# Patient Record
Sex: MALE | Race: WHITE | NOT HISPANIC OR LATINO | Employment: FULL TIME | RURAL
[De-identification: names, ages, dates, MRNs, and addresses within clinical notes are randomized per-mention and may not be internally consistent; named-entity substitution may affect disease eponyms.]

---

## 2021-05-10 RX ORDER — CLOPIDOGREL BISULFATE 75 MG/1
75 TABLET ORAL DAILY
COMMUNITY
End: 2021-05-10 | Stop reason: SDUPTHER

## 2021-05-10 RX ORDER — CLOPIDOGREL BISULFATE 75 MG/1
75 TABLET ORAL DAILY
Qty: 90 TABLET | Refills: 1 | Status: SHIPPED | OUTPATIENT
Start: 2021-05-10 | End: 2021-07-06 | Stop reason: SDUPTHER

## 2021-05-10 RX ORDER — METOPROLOL SUCCINATE 100 MG/1
100 TABLET, EXTENDED RELEASE ORAL DAILY
Qty: 90 TABLET | Refills: 1 | Status: SHIPPED | OUTPATIENT
Start: 2021-05-10 | End: 2021-07-06 | Stop reason: SDUPTHER

## 2021-05-10 RX ORDER — METOPROLOL SUCCINATE 100 MG/1
100 TABLET, EXTENDED RELEASE ORAL DAILY
COMMUNITY
End: 2021-05-10 | Stop reason: SDUPTHER

## 2021-06-10 VITALS
HEIGHT: 68 IN | BODY MASS INDEX: 35.61 KG/M2 | WEIGHT: 235 LBS | DIASTOLIC BLOOD PRESSURE: 90 MMHG | SYSTOLIC BLOOD PRESSURE: 142 MMHG | HEART RATE: 76 BPM

## 2021-06-17 RX ORDER — LISINOPRIL 40 MG/1
40 TABLET ORAL DAILY
Qty: 30 TABLET | Refills: 1 | Status: SHIPPED | OUTPATIENT
Start: 2021-06-17 | End: 2021-07-06 | Stop reason: SDUPTHER

## 2021-07-06 ENCOUNTER — OFFICE VISIT (OUTPATIENT)
Dept: CARDIOLOGY | Facility: CLINIC | Age: 61
End: 2021-07-06
Payer: COMMERCIAL

## 2021-07-06 VITALS
BODY MASS INDEX: 36.07 KG/M2 | HEART RATE: 80 BPM | WEIGHT: 238 LBS | SYSTOLIC BLOOD PRESSURE: 142 MMHG | DIASTOLIC BLOOD PRESSURE: 100 MMHG | OXYGEN SATURATION: 98 % | HEIGHT: 68 IN

## 2021-07-06 DIAGNOSIS — Z79.899 OTHER LONG TERM (CURRENT) DRUG THERAPY: ICD-10-CM

## 2021-07-06 DIAGNOSIS — G47.30 SLEEP APNEA, UNSPECIFIED TYPE: ICD-10-CM

## 2021-07-06 DIAGNOSIS — E78.5 HYPERLIPIDEMIA, UNSPECIFIED HYPERLIPIDEMIA TYPE: Primary | ICD-10-CM

## 2021-07-06 DIAGNOSIS — I25.10 CORONARY ARTERY DISEASE INVOLVING NATIVE HEART WITHOUT ANGINA PECTORIS, UNSPECIFIED VESSEL OR LESION TYPE: ICD-10-CM

## 2021-07-06 DIAGNOSIS — I10 HYPERTENSION, UNSPECIFIED TYPE: ICD-10-CM

## 2021-07-06 DIAGNOSIS — E78.5 HYPERLIPIDEMIA, UNSPECIFIED HYPERLIPIDEMIA TYPE: ICD-10-CM

## 2021-07-06 DIAGNOSIS — I25.2 OLD MI (MYOCARDIAL INFARCTION): ICD-10-CM

## 2021-07-06 DIAGNOSIS — I25.10 CORONARY ARTERY DISEASE, ANGINA PRESENCE UNSPECIFIED, UNSPECIFIED VESSEL OR LESION TYPE, UNSPECIFIED WHETHER NATIVE OR TRANSPLANTED HEART: Primary | ICD-10-CM

## 2021-07-06 PROCEDURE — 3008F PR BODY MASS INDEX (BMI) DOCUMENTED: ICD-10-PCS | Mod: CPTII,,, | Performed by: NURSE PRACTITIONER

## 2021-07-06 PROCEDURE — 93010 ELECTROCARDIOGRAM REPORT: CPT | Mod: S$PBB,,, | Performed by: INTERNAL MEDICINE

## 2021-07-06 PROCEDURE — 93010 EKG 12-LEAD: ICD-10-PCS | Mod: S$PBB,,, | Performed by: INTERNAL MEDICINE

## 2021-07-06 PROCEDURE — 3008F BODY MASS INDEX DOCD: CPT | Mod: CPTII,,, | Performed by: NURSE PRACTITIONER

## 2021-07-06 PROCEDURE — 99214 OFFICE O/P EST MOD 30 MIN: CPT | Mod: S$PBB,,, | Performed by: NURSE PRACTITIONER

## 2021-07-06 PROCEDURE — 93005 ELECTROCARDIOGRAM TRACING: CPT | Mod: PBBFAC | Performed by: INTERNAL MEDICINE

## 2021-07-06 PROCEDURE — 99214 PR OFFICE/OUTPT VISIT, EST, LEVL IV, 30-39 MIN: ICD-10-PCS | Mod: S$PBB,,, | Performed by: NURSE PRACTITIONER

## 2021-07-06 PROCEDURE — 99213 OFFICE O/P EST LOW 20 MIN: CPT | Mod: PBBFAC | Performed by: NURSE PRACTITIONER

## 2021-07-06 RX ORDER — ROSUVASTATIN CALCIUM 5 MG/1
5 TABLET, COATED ORAL DAILY
Qty: 90 TABLET | Refills: 3 | Status: SHIPPED | OUTPATIENT
Start: 2021-07-06 | End: 2022-08-08 | Stop reason: SDUPTHER

## 2021-07-06 RX ORDER — LISINOPRIL 40 MG/1
40 TABLET ORAL DAILY
Qty: 30 TABLET | Refills: 1 | Status: SHIPPED | OUTPATIENT
Start: 2021-07-06 | End: 2021-12-20 | Stop reason: SDUPTHER

## 2021-07-06 RX ORDER — ASPIRIN 81 MG/1
81 TABLET ORAL DAILY
COMMUNITY

## 2021-07-06 RX ORDER — CLOPIDOGREL BISULFATE 75 MG/1
75 TABLET ORAL DAILY
Qty: 90 TABLET | Refills: 1 | Status: SHIPPED | OUTPATIENT
Start: 2021-07-06 | End: 2022-01-19 | Stop reason: SDUPTHER

## 2021-07-06 RX ORDER — METOPROLOL SUCCINATE 100 MG/1
100 TABLET, EXTENDED RELEASE ORAL DAILY
Qty: 90 TABLET | Refills: 1 | Status: SHIPPED | OUTPATIENT
Start: 2021-07-06 | End: 2022-01-19 | Stop reason: SDUPTHER

## 2021-12-20 DIAGNOSIS — I10 HYPERTENSION, UNSPECIFIED TYPE: ICD-10-CM

## 2021-12-20 RX ORDER — LISINOPRIL 40 MG/1
40 TABLET ORAL DAILY
Qty: 30 TABLET | Refills: 5 | Status: SHIPPED | OUTPATIENT
Start: 2021-12-20 | End: 2022-07-18 | Stop reason: SDUPTHER

## 2022-01-19 DIAGNOSIS — I10 HYPERTENSION, UNSPECIFIED TYPE: ICD-10-CM

## 2022-01-19 DIAGNOSIS — I25.10 CORONARY ARTERY DISEASE: ICD-10-CM

## 2022-01-19 RX ORDER — CLOPIDOGREL BISULFATE 75 MG/1
75 TABLET ORAL DAILY
Qty: 90 TABLET | Refills: 1 | Status: SHIPPED | OUTPATIENT
Start: 2022-01-19 | End: 2022-07-18

## 2022-01-19 RX ORDER — METOPROLOL SUCCINATE 100 MG/1
100 TABLET, EXTENDED RELEASE ORAL DAILY
Qty: 90 TABLET | Refills: 1 | Status: SHIPPED | OUTPATIENT
Start: 2022-01-19 | End: 2022-07-18 | Stop reason: SDUPTHER

## 2022-02-07 ENCOUNTER — OFFICE VISIT (OUTPATIENT)
Dept: INTERNAL MEDICINE | Facility: CLINIC | Age: 62
End: 2022-02-07
Payer: COMMERCIAL

## 2022-02-07 ENCOUNTER — HOSPITAL ENCOUNTER (OUTPATIENT)
Dept: RADIOLOGY | Facility: HOSPITAL | Age: 62
Discharge: HOME | End: 2022-02-07
Attending: INTERNAL MEDICINE
Payer: COMMERCIAL

## 2022-02-07 VITALS
TEMPERATURE: 99 F | RESPIRATION RATE: 16 BRPM | BODY MASS INDEX: 36.68 KG/M2 | DIASTOLIC BLOOD PRESSURE: 100 MMHG | OXYGEN SATURATION: 98 % | WEIGHT: 242 LBS | HEART RATE: 88 BPM | HEIGHT: 68 IN | SYSTOLIC BLOOD PRESSURE: 160 MMHG

## 2022-02-07 DIAGNOSIS — M19.011 OSTEOARTHRITIS OF RIGHT SHOULDER, UNSPECIFIED OSTEOARTHRITIS TYPE: ICD-10-CM

## 2022-02-07 DIAGNOSIS — R91.1 SOLITARY PULMONARY NODULE: Primary | ICD-10-CM

## 2022-02-07 DIAGNOSIS — G47.33 OSA (OBSTRUCTIVE SLEEP APNEA): ICD-10-CM

## 2022-02-07 DIAGNOSIS — E78.5 HYPERLIPIDEMIA LDL GOAL <100: ICD-10-CM

## 2022-02-07 DIAGNOSIS — I25.10 CORONARY ARTERY DISEASE, UNSPECIFIED VESSEL OR LESION TYPE, UNSPECIFIED WHETHER ANGINA PRESENT, UNSPECIFIED WHETHER NATIVE OR TRANSPLANTED HEART: ICD-10-CM

## 2022-02-07 DIAGNOSIS — E11.9 CONTROLLED TYPE 2 DIABETES MELLITUS WITHOUT COMPLICATION, WITHOUT LONG-TERM CURRENT USE OF INSULIN: ICD-10-CM

## 2022-02-07 DIAGNOSIS — Z12.5 PROSTATE CANCER SCREENING: ICD-10-CM

## 2022-02-07 DIAGNOSIS — I10 ESSENTIAL HYPERTENSION: ICD-10-CM

## 2022-02-07 PROCEDURE — 4010F PR ACE/ARB THEARPY RXD/TAKEN: ICD-10-PCS | Mod: ,,, | Performed by: INTERNAL MEDICINE

## 2022-02-07 PROCEDURE — 1160F PR REVIEW ALL MEDS BY PRESCRIBER/CLIN PHARMACIST DOCUMENTED: ICD-10-PCS | Mod: ,,, | Performed by: INTERNAL MEDICINE

## 2022-02-07 PROCEDURE — 99215 OFFICE O/P EST HI 40 MIN: CPT | Mod: PBBFAC | Performed by: INTERNAL MEDICINE

## 2022-02-07 PROCEDURE — 1160F RVW MEDS BY RX/DR IN RCRD: CPT | Mod: ,,, | Performed by: INTERNAL MEDICINE

## 2022-02-07 PROCEDURE — 73030 XR SHOULDER COMPLETE 2 OR MORE VIEWS RIGHT: ICD-10-PCS | Mod: 26,RT,, | Performed by: RADIOLOGY

## 2022-02-07 PROCEDURE — 73030 X-RAY EXAM OF SHOULDER: CPT | Mod: 26,RT,, | Performed by: RADIOLOGY

## 2022-02-07 PROCEDURE — 3080F PR MOST RECENT DIASTOLIC BLOOD PRESSURE >= 90 MM HG: ICD-10-PCS | Mod: ,,, | Performed by: INTERNAL MEDICINE

## 2022-02-07 PROCEDURE — 1159F PR MEDICATION LIST DOCUMENTED IN MEDICAL RECORD: ICD-10-PCS | Mod: ,,, | Performed by: INTERNAL MEDICINE

## 2022-02-07 PROCEDURE — 4010F ACE/ARB THERAPY RXD/TAKEN: CPT | Mod: ,,, | Performed by: INTERNAL MEDICINE

## 2022-02-07 PROCEDURE — 3077F PR MOST RECENT SYSTOLIC BLOOD PRESSURE >= 140 MM HG: ICD-10-PCS | Mod: ,,, | Performed by: INTERNAL MEDICINE

## 2022-02-07 PROCEDURE — 3008F BODY MASS INDEX DOCD: CPT | Mod: ,,, | Performed by: INTERNAL MEDICINE

## 2022-02-07 PROCEDURE — 3077F SYST BP >= 140 MM HG: CPT | Mod: ,,, | Performed by: INTERNAL MEDICINE

## 2022-02-07 PROCEDURE — 3080F DIAST BP >= 90 MM HG: CPT | Mod: ,,, | Performed by: INTERNAL MEDICINE

## 2022-02-07 PROCEDURE — 1159F MED LIST DOCD IN RCRD: CPT | Mod: ,,, | Performed by: INTERNAL MEDICINE

## 2022-02-07 PROCEDURE — 3008F PR BODY MASS INDEX (BMI) DOCUMENTED: ICD-10-PCS | Mod: ,,, | Performed by: INTERNAL MEDICINE

## 2022-02-07 PROCEDURE — 73030 X-RAY EXAM OF SHOULDER: CPT | Mod: TC,RT

## 2022-02-07 PROCEDURE — 99204 OFFICE O/P NEW MOD 45 MIN: CPT | Mod: S$PBB,,, | Performed by: INTERNAL MEDICINE

## 2022-02-07 PROCEDURE — 99204 PR OFFICE/OUTPT VISIT, NEW, LEVL IV, 45-59 MIN: ICD-10-PCS | Mod: S$PBB,,, | Performed by: INTERNAL MEDICINE

## 2022-02-07 RX ORDER — MELOXICAM 15 MG/1
15 TABLET ORAL DAILY
Qty: 90 TABLET | Refills: 3 | Status: SHIPPED | OUTPATIENT
Start: 2022-02-07 | End: 2022-11-03 | Stop reason: ALTCHOICE

## 2022-02-07 NOTE — PROGRESS NOTES
Subjective:       Patient ID: Eligio Felix is a 61 y.o. male.    Chief Complaint: Establish Care (Shoulder pain-spot on right lung-)    C/o cannot life r arm very high and pains at night  Also with spot on r lung that needs f/u    Review of Systems   Constitutional: Negative for appetite change, fatigue and unexpected weight change.   HENT: Negative for postnasal drip, trouble swallowing and goiter.    Eyes: Negative for visual disturbance.   Respiratory: Negative for apnea, choking and chest tightness.    Cardiovascular: Negative for chest pain and leg swelling.   Gastrointestinal: Negative for blood in stool, change in bowel habit and change in bowel habit.   Genitourinary: Negative for difficulty urinating and frequency.   Musculoskeletal: Negative for arthralgias, back pain and leg pain.   Integumentary:  Negative for rash.   Neurological: Negative for disturbances in coordination, memory loss and coordination difficulties.   Hematological: Negative for adenopathy.   Psychiatric/Behavioral: Negative for agitation. The patient is not hyperactive.          Objective:      Physical Exam  Vitals and nursing note reviewed.   Constitutional:       Appearance: Normal appearance. He is obese.   HENT:      Head: Normocephalic and atraumatic.      Right Ear: Tympanic membrane, ear canal and external ear normal.      Left Ear: Tympanic membrane, ear canal and external ear normal.      Nose: Nose normal.      Mouth/Throat:      Mouth: Mucous membranes are moist.      Pharynx: Oropharynx is clear.   Eyes:      Extraocular Movements: Extraocular movements intact.      Conjunctiva/sclera: Conjunctivae normal.      Pupils: Pupils are equal, round, and reactive to light.   Cardiovascular:      Rate and Rhythm: Normal rate and regular rhythm.      Pulses: Normal pulses.      Heart sounds: Normal heart sounds.   Pulmonary:      Effort: Pulmonary effort is normal.      Breath sounds: Normal breath sounds.   Abdominal:       General: Abdomen is flat. Bowel sounds are normal.      Palpations: Abdomen is soft.   Musculoskeletal:         General: Normal range of motion.      Cervical back: Normal range of motion.   Skin:     General: Skin is warm and dry.      Capillary Refill: Capillary refill takes less than 2 seconds.   Neurological:      General: No focal deficit present.      Mental Status: He is alert and oriented to person, place, and time.   Psychiatric:         Mood and Affect: Mood normal.         Behavior: Behavior normal.         Thought Content: Thought content normal.         Judgment: Judgment normal.         Assessment:       1. Essential hypertension    2. Hyperlipidemia LDL goal <100    3. Coronary artery disease, unspecified vessel or lesion type, unspecified whether angina present, unspecified whether native or transplanted heart    4. AYESHA (obstructive sleep apnea)    5. Controlled type 2 diabetes mellitus without complication, without long-term current use of insulin    6. Prostate cancer screening        Plan:         Patient Instructions   Continue surrent meds nd f/u 3 months        Problem List Items Addressed This Visit        Cardiac/Vascular    Coronary artery disease    Overview     DIAMOND mid LAD x 2 and very prox LAD x 1 2/10/2014           Other Visit Diagnoses     Essential hypertension    -  Primary    Relevant Orders    CBC Auto Differential    Comprehensive Metabolic Panel    TSH    Hyperlipidemia LDL goal <100        Relevant Orders    Lipid Panel    AYESHA (obstructive sleep apnea)        Controlled type 2 diabetes mellitus without complication, without long-term current use of insulin        Relevant Orders    Hemoglobin A1C    Prostate cancer screening        Relevant Orders    PSA, Screening

## 2022-02-11 ENCOUNTER — HOSPITAL ENCOUNTER (OUTPATIENT)
Dept: RADIOLOGY | Facility: HOSPITAL | Age: 62
Discharge: HOME | End: 2022-02-11
Attending: INTERNAL MEDICINE
Payer: COMMERCIAL

## 2022-02-11 DIAGNOSIS — R91.1 SOLITARY PULMONARY NODULE: ICD-10-CM

## 2022-02-11 PROCEDURE — 71250 CT THORAX DX C-: CPT | Mod: 26,,, | Performed by: RADIOLOGY

## 2022-02-11 PROCEDURE — 71250 CT THORAX DX C-: CPT | Mod: TC

## 2022-02-11 PROCEDURE — 71250 CT CHEST WITHOUT CONTRAST: ICD-10-PCS | Mod: 26,,, | Performed by: RADIOLOGY

## 2022-02-15 ENCOUNTER — OFFICE VISIT (OUTPATIENT)
Dept: CARDIOLOGY | Facility: CLINIC | Age: 62
End: 2022-02-15
Payer: COMMERCIAL

## 2022-02-15 VITALS
OXYGEN SATURATION: 98 % | HEART RATE: 77 BPM | HEIGHT: 68 IN | BODY MASS INDEX: 36.68 KG/M2 | DIASTOLIC BLOOD PRESSURE: 98 MMHG | WEIGHT: 242 LBS | SYSTOLIC BLOOD PRESSURE: 178 MMHG

## 2022-02-15 DIAGNOSIS — I10 HYPERTENSION, UNSPECIFIED TYPE: ICD-10-CM

## 2022-02-15 DIAGNOSIS — I25.10 CORONARY ARTERY DISEASE, UNSPECIFIED VESSEL OR LESION TYPE, UNSPECIFIED WHETHER ANGINA PRESENT, UNSPECIFIED WHETHER NATIVE OR TRANSPLANTED HEART: Primary | ICD-10-CM

## 2022-02-15 PROCEDURE — 1160F PR REVIEW ALL MEDS BY PRESCRIBER/CLIN PHARMACIST DOCUMENTED: ICD-10-PCS | Mod: ,,, | Performed by: NURSE PRACTITIONER

## 2022-02-15 PROCEDURE — 93010 EKG 12-LEAD: ICD-10-PCS | Mod: S$PBB,,, | Performed by: INTERNAL MEDICINE

## 2022-02-15 PROCEDURE — 3080F DIAST BP >= 90 MM HG: CPT | Mod: ,,, | Performed by: NURSE PRACTITIONER

## 2022-02-15 PROCEDURE — 93010 ELECTROCARDIOGRAM REPORT: CPT | Mod: S$PBB,,, | Performed by: INTERNAL MEDICINE

## 2022-02-15 PROCEDURE — 3008F BODY MASS INDEX DOCD: CPT | Mod: ,,, | Performed by: NURSE PRACTITIONER

## 2022-02-15 PROCEDURE — 1159F MED LIST DOCD IN RCRD: CPT | Mod: ,,, | Performed by: NURSE PRACTITIONER

## 2022-02-15 PROCEDURE — 3044F HG A1C LEVEL LT 7.0%: CPT | Mod: ,,, | Performed by: NURSE PRACTITIONER

## 2022-02-15 PROCEDURE — 3008F PR BODY MASS INDEX (BMI) DOCUMENTED: ICD-10-PCS | Mod: ,,, | Performed by: NURSE PRACTITIONER

## 2022-02-15 PROCEDURE — 3080F PR MOST RECENT DIASTOLIC BLOOD PRESSURE >= 90 MM HG: ICD-10-PCS | Mod: ,,, | Performed by: NURSE PRACTITIONER

## 2022-02-15 PROCEDURE — 1160F RVW MEDS BY RX/DR IN RCRD: CPT | Mod: ,,, | Performed by: NURSE PRACTITIONER

## 2022-02-15 PROCEDURE — 93005 ELECTROCARDIOGRAM TRACING: CPT | Mod: PBBFAC | Performed by: INTERNAL MEDICINE

## 2022-02-15 PROCEDURE — 99214 PR OFFICE/OUTPT VISIT, EST, LEVL IV, 30-39 MIN: ICD-10-PCS | Mod: S$PBB,,, | Performed by: NURSE PRACTITIONER

## 2022-02-15 PROCEDURE — 4010F ACE/ARB THERAPY RXD/TAKEN: CPT | Mod: ,,, | Performed by: NURSE PRACTITIONER

## 2022-02-15 PROCEDURE — 4010F PR ACE/ARB THEARPY RXD/TAKEN: ICD-10-PCS | Mod: ,,, | Performed by: NURSE PRACTITIONER

## 2022-02-15 PROCEDURE — 1159F PR MEDICATION LIST DOCUMENTED IN MEDICAL RECORD: ICD-10-PCS | Mod: ,,, | Performed by: NURSE PRACTITIONER

## 2022-02-15 PROCEDURE — 99213 OFFICE O/P EST LOW 20 MIN: CPT | Mod: PBBFAC | Performed by: NURSE PRACTITIONER

## 2022-02-15 PROCEDURE — 99214 OFFICE O/P EST MOD 30 MIN: CPT | Mod: S$PBB,,, | Performed by: NURSE PRACTITIONER

## 2022-02-15 PROCEDURE — 3077F SYST BP >= 140 MM HG: CPT | Mod: ,,, | Performed by: NURSE PRACTITIONER

## 2022-02-15 PROCEDURE — 3044F PR MOST RECENT HEMOGLOBIN A1C LEVEL <7.0%: ICD-10-PCS | Mod: ,,, | Performed by: NURSE PRACTITIONER

## 2022-02-15 PROCEDURE — 3077F PR MOST RECENT SYSTOLIC BLOOD PRESSURE >= 140 MM HG: ICD-10-PCS | Mod: ,,, | Performed by: NURSE PRACTITIONER

## 2022-02-15 RX ORDER — AMLODIPINE BESYLATE 5 MG/1
5 TABLET ORAL DAILY
Qty: 30 TABLET | Refills: 11 | Status: SHIPPED | OUTPATIENT
Start: 2022-02-15 | End: 2023-02-14 | Stop reason: SDUPTHER

## 2022-02-15 NOTE — PROGRESS NOTES
[facility] note        DATE OF SERVICE: 02/15/2022       PCP: INDER Jones      CHIEF COMPLAINT:   Chief Complaint   Patient presents with    Follow-up     Patient denies any cardiac complaints today.        HISTORY OF PRESENT ILLNESS:  Eligio Felix is a 61 y.o. male with a PMH of   Past Medical History:   Diagnosis Date    C. difficile colitis     Coronary artery disease     CTS (carpal tunnel syndrome)     Diabetes mellitus     Hyperlipidemia     Hypertension     Sleep apnea      who presents for routine follow up. He states after the last OV his bp got better but in the last few weeks has gotten bad again. He is wearing his cpap every night. He has arthritis in both shoulders with pain at night requiring meds. He was taking Advil but was changed to Mobic. He has also gained 4 lbs and been off of his usual diet.   Chief Complaint   Patient presents with    Follow-up     Patient denies any cardiac complaints today.            PAST MEDICAL HISTORY:  Past Medical History:   Diagnosis Date    C. difficile colitis     Coronary artery disease     CTS (carpal tunnel syndrome)     Diabetes mellitus     Hyperlipidemia     Hypertension     Sleep apnea        PAST SURGICAL HISTORY:  Past Surgical History:   Procedure Laterality Date    LITHOTRIPSY      LUMBAR SPINE SURGERY      NECK SURGERY         SOCIAL HISTORY:  Social History     Socioeconomic History    Marital status:    Tobacco Use    Smoking status: Never Smoker    Smokeless tobacco: Never Used   Substance and Sexual Activity    Alcohol use: Yes    Drug use: Never       FAMILY HISTORY:  Family History   Problem Relation Age of Onset    Pacemaker/defibrilator Father     Hypertension Sister          ALLERGIES:  Review of patient's allergies indicates:   Allergen Reactions    Iodinated contrast media         MEDICATIONS:    Current Outpatient Medications:     amLODIPine (NORVASC) 5 MG tablet, Take 1  "tablet (5 mg total) by mouth once daily., Disp: 30 tablet, Rfl: 11    aspirin (ECOTRIN) 81 MG EC tablet, Take 81 mg by mouth once daily., Disp: , Rfl:     clopidogreL (PLAVIX) 75 mg tablet, Take 1 tablet (75 mg total) by mouth once daily., Disp: 90 tablet, Rfl: 1    lisinopriL (PRINIVIL,ZESTRIL) 40 MG tablet, Take 1 tablet (40 mg total) by mouth once daily., Disp: 30 tablet, Rfl: 5    meloxicam (MOBIC) 15 MG tablet, Take 1 tablet (15 mg total) by mouth once daily., Disp: 90 tablet, Rfl: 3    metoprolol succinate (TOPROL-XL) 100 MG 24 hr tablet, Take 1 tablet (100 mg total) by mouth once daily., Disp: 90 tablet, Rfl: 1    rosuvastatin (CRESTOR) 5 MG tablet, Take 1 tablet (5 mg total) by mouth once daily., Disp: 90 tablet, Rfl: 3  Medications have been reviewed but not reconciled. He did not bring his meds today.    PHYSICAL EXAM:  BP (!) 178/98 (BP Location: Left arm, Patient Position: Sitting)   Pulse 77   Ht 5' 8" (1.727 m)   Wt 109.8 kg (242 lb)   SpO2 98%   BMI 36.80 kg/m²   Wt Readings from Last 3 Encounters:   02/15/22 109.8 kg (242 lb)   02/07/22 109.8 kg (242 lb)   07/06/21 108 kg (238 lb)      Body mass index is 36.8 kg/m².    Physical Exam  Vitals and nursing note reviewed.   Constitutional:       Appearance: Normal appearance. He is obese.   HENT:      Head: Normocephalic and atraumatic.   Eyes:      Pupils: Pupils are equal, round, and reactive to light.   Neck:      Vascular: No carotid bruit.   Cardiovascular:      Rate and Rhythm: Normal rate and regular rhythm.      Pulses: Normal pulses.      Heart sounds: Normal heart sounds.   Pulmonary:      Effort: Pulmonary effort is normal.      Breath sounds: Normal breath sounds.   Abdominal:      General: Bowel sounds are normal.      Palpations: Abdomen is soft.   Musculoskeletal:      Cervical back: Neck supple.      Right lower leg: No edema.      Left lower leg: No edema.   Skin:     General: Skin is warm and dry.      Capillary Refill: " Capillary refill takes less than 2 seconds.   Neurological:      General: No focal deficit present.      Mental Status: He is alert and oriented to person, place, and time.   Psychiatric:         Mood and Affect: Mood normal.         Behavior: Behavior normal.         LABS REVIEWED:  Lab Results   Component Value Date    WBC 9.11 02/07/2022    RBC 5.12 02/07/2022    HGB 15.4 02/07/2022    HCT 47.0 02/07/2022    MCV 91.8 02/07/2022    MCH 30.1 02/07/2022    MCHC 32.8 02/07/2022    RDW 13.9 02/07/2022     02/07/2022    MPV 10.1 02/07/2022    NRBC 0.0 02/07/2022     Lab Results   Component Value Date     02/07/2022    K 4.5 02/07/2022     (H) 02/07/2022    CO2 26 02/07/2022    BUN 25 (H) 02/07/2022     Lab Results   Component Value Date    AST 18 02/07/2022    ALT 33 02/07/2022     Lab Results   Component Value Date     (H) 02/07/2022    HGBA1C 6.0 02/07/2022     Lab Results   Component Value Date    CHOL 216 (H) 02/07/2022    HDL 43 02/07/2022    TRIG 116 02/07/2022    CHOLHDL 5.0 02/07/2022       CARDIAC STUDIES REVIEWED:EKG: RSR; RBBB; HR 75 bpm      ASSESSMENT:   Patient Active Problem List   Diagnosis    Coronary artery disease    Old MI (myocardial infarction)    Hypertension    Hyperlipidemia    Sleep apnea            Problem List Items Addressed This Visit        Cardiac/Vascular    Coronary artery disease - Primary    Overview     DIAMOND mid LAD x 2 and very prox LAD x 1 2/10/2014         Relevant Orders    EKG 12-lead    Hypertension    Relevant Medications    amLODIPine (NORVASC) 5 MG tablet           PLAN: add Norvasc 5 mg po daily  bp check in 2 weeks  Avoid NSAIDS as much as possible  Orders Placed This Encounter   Procedures    EKG 12-lead     Standing Status:   Future     Standing Expiration Date:   2/15/2023     Order Specific Question:   Diagnosis     Answer:   CAD (coronary artery disease) [[id number]]      RTC: 6 months

## 2022-08-08 DIAGNOSIS — I25.10 CORONARY ARTERY DISEASE, UNSPECIFIED VESSEL OR LESION TYPE, UNSPECIFIED WHETHER ANGINA PRESENT, UNSPECIFIED WHETHER NATIVE OR TRANSPLANTED HEART: ICD-10-CM

## 2022-08-08 DIAGNOSIS — E78.5 HYPERLIPIDEMIA, UNSPECIFIED HYPERLIPIDEMIA TYPE: ICD-10-CM

## 2022-08-08 RX ORDER — ROSUVASTATIN CALCIUM 5 MG/1
5 TABLET, COATED ORAL DAILY
Qty: 90 TABLET | Refills: 0 | Status: SHIPPED | OUTPATIENT
Start: 2022-08-08 | End: 2022-11-15

## 2022-11-02 ENCOUNTER — OFFICE VISIT (OUTPATIENT)
Dept: PRIMARY CARE CLINIC | Facility: CLINIC | Age: 62
End: 2022-11-02
Payer: COMMERCIAL

## 2022-11-02 VITALS
OXYGEN SATURATION: 99 % | HEIGHT: 68 IN | TEMPERATURE: 98 F | RESPIRATION RATE: 17 BRPM | WEIGHT: 230 LBS | DIASTOLIC BLOOD PRESSURE: 80 MMHG | SYSTOLIC BLOOD PRESSURE: 130 MMHG | HEART RATE: 87 BPM | BODY MASS INDEX: 34.86 KG/M2

## 2022-11-02 DIAGNOSIS — N13.30 HYDRONEPHROSIS OF LEFT KIDNEY: Primary | ICD-10-CM

## 2022-11-02 DIAGNOSIS — N20.0 URINARY TRACT OBSTRUCTION DUE TO KIDNEY STONE: ICD-10-CM

## 2022-11-02 DIAGNOSIS — N13.8 URINARY TRACT OBSTRUCTION DUE TO KIDNEY STONE: ICD-10-CM

## 2022-11-02 PROCEDURE — 3008F BODY MASS INDEX DOCD: CPT | Mod: ,,, | Performed by: NURSE PRACTITIONER

## 2022-11-02 PROCEDURE — 3075F SYST BP GE 130 - 139MM HG: CPT | Mod: ,,, | Performed by: NURSE PRACTITIONER

## 2022-11-02 PROCEDURE — 4010F ACE/ARB THERAPY RXD/TAKEN: CPT | Mod: ,,, | Performed by: NURSE PRACTITIONER

## 2022-11-02 PROCEDURE — 3079F DIAST BP 80-89 MM HG: CPT | Mod: ,,, | Performed by: NURSE PRACTITIONER

## 2022-11-02 PROCEDURE — 3044F HG A1C LEVEL LT 7.0%: CPT | Mod: ,,, | Performed by: NURSE PRACTITIONER

## 2022-11-02 PROCEDURE — 4010F PR ACE/ARB THEARPY RXD/TAKEN: ICD-10-PCS | Mod: ,,, | Performed by: NURSE PRACTITIONER

## 2022-11-02 PROCEDURE — 3079F PR MOST RECENT DIASTOLIC BLOOD PRESSURE 80-89 MM HG: ICD-10-PCS | Mod: ,,, | Performed by: NURSE PRACTITIONER

## 2022-11-02 PROCEDURE — 3008F PR BODY MASS INDEX (BMI) DOCUMENTED: ICD-10-PCS | Mod: ,,, | Performed by: NURSE PRACTITIONER

## 2022-11-02 PROCEDURE — 99213 PR OFFICE/OUTPT VISIT, EST, LEVL III, 20-29 MIN: ICD-10-PCS | Mod: ,,, | Performed by: NURSE PRACTITIONER

## 2022-11-02 PROCEDURE — 3075F PR MOST RECENT SYSTOLIC BLOOD PRESS GE 130-139MM HG: ICD-10-PCS | Mod: ,,, | Performed by: NURSE PRACTITIONER

## 2022-11-02 PROCEDURE — 3044F PR MOST RECENT HEMOGLOBIN A1C LEVEL <7.0%: ICD-10-PCS | Mod: ,,, | Performed by: NURSE PRACTITIONER

## 2022-11-02 PROCEDURE — 99213 OFFICE O/P EST LOW 20 MIN: CPT | Mod: ,,, | Performed by: NURSE PRACTITIONER

## 2022-11-02 RX ORDER — HYDROCODONE BITARTRATE AND ACETAMINOPHEN 5; 325 MG/1; MG/1
TABLET ORAL
COMMUNITY
Start: 2022-05-20 | End: 2022-11-29

## 2022-11-02 NOTE — PROGRESS NOTES
Subjective:       Patient ID: Eligio Felix is a 62 y.o. male.    Chief Complaint: Nephrolithiasis, Hematuria, Flank Pain (left), and Nausea    HPI Eligio Felix presents today with above complaints  Reports prior history of kidney stones. Patient was most recently evaluated in [place] while working. 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    Review of Systems   Constitutional:  Negative for fatigue, fever and unexpected weight change.   HENT:  Negative for nasal congestion, postnasal drip and sore throat.    Eyes:  Negative for pain and redness.   Respiratory:  Negative for cough, shortness of breath and wheezing.    Cardiovascular:  Negative for chest pain and leg swelling.   Gastrointestinal:  Negative for abdominal pain, constipation, diarrhea and nausea.   Genitourinary:  Positive for flank pain. Negative for difficulty urinating, dysuria and hematuria.   Musculoskeletal:  Negative for gait problem.   Integumentary:  Negative for color change and rash.   Neurological:  Negative for vertigo, syncope and headaches.       Objective:      Physical Exam  Constitutional:       General: He is not in acute distress.     Appearance: Normal appearance. He is not ill-appearing.   HENT:      Head: Normocephalic.   Eyes:      Conjunctiva/sclera: Conjunctivae normal.      Pupils: Pupils are equal, round, and reactive to light.   Cardiovascular:      Rate and Rhythm: Normal rate and regular rhythm.      Heart sounds: No murmur heard.  Pulmonary:      Effort: Pulmonary effort is normal. No respiratory distress.      Breath sounds: Normal breath sounds. No wheezing, rhonchi or rales.   Abdominal:      General: Bowel sounds are normal.      Palpations: Abdomen is soft.      Tenderness: There is no abdominal tenderness.   Musculoskeletal:         General: No swelling. Normal range of motion.      Cervical back: Neck supple. No rigidity or tenderness.      Right lower leg: No edema.      Left lower leg: No edema.    Skin:     General: Skin is warm and dry.   Neurological:      General: No focal deficit present.      Mental Status: He is alert and oriented to person, place, and time.      Cranial Nerves: No cranial nerve deficit.       Assessment:       Problem List Items Addressed This Visit    None  Visit Diagnoses       Hydronephrosis of left kidney    -  Primary    Relevant Orders    Ambulatory referral/consult to Urology    Urinary tract obstruction due to kidney stone        Relevant Orders    Ambulatory referral/consult to Urology              Plan:       Referral to urology for evaluation  Patient has pain medication on-hand if needed  To ED with any s/s of obstruction

## 2022-11-03 ENCOUNTER — TELEPHONE (OUTPATIENT)
Dept: UROLOGY | Facility: CLINIC | Age: 62
End: 2022-11-03
Payer: COMMERCIAL

## 2022-11-03 ENCOUNTER — HOSPITAL ENCOUNTER (OUTPATIENT)
Dept: RADIOLOGY | Facility: HOSPITAL | Age: 62
Discharge: HOME | End: 2022-11-03
Attending: NURSE PRACTITIONER
Payer: COMMERCIAL

## 2022-11-03 ENCOUNTER — OFFICE VISIT (OUTPATIENT)
Dept: UROLOGY | Facility: CLINIC | Age: 62
End: 2022-11-03
Payer: COMMERCIAL

## 2022-11-03 VITALS
HEIGHT: 68 IN | DIASTOLIC BLOOD PRESSURE: 78 MMHG | HEART RATE: 93 BPM | WEIGHT: 230 LBS | OXYGEN SATURATION: 86 % | BODY MASS INDEX: 34.86 KG/M2 | TEMPERATURE: 98 F | SYSTOLIC BLOOD PRESSURE: 128 MMHG

## 2022-11-03 DIAGNOSIS — R31.9 HEMATURIA, UNSPECIFIED TYPE: ICD-10-CM

## 2022-11-03 DIAGNOSIS — N20.0 KIDNEY STONES: ICD-10-CM

## 2022-11-03 DIAGNOSIS — N13.30 HYDRONEPHROSIS OF LEFT KIDNEY: ICD-10-CM

## 2022-11-03 DIAGNOSIS — Z01.818 PREOP EXAMINATION: ICD-10-CM

## 2022-11-03 DIAGNOSIS — N20.0 URINARY TRACT OBSTRUCTION DUE TO KIDNEY STONE: ICD-10-CM

## 2022-11-03 DIAGNOSIS — R10.32 LEFT LOWER QUADRANT ABDOMINAL PAIN: ICD-10-CM

## 2022-11-03 DIAGNOSIS — N13.30 HYDRONEPHROSIS, LEFT: ICD-10-CM

## 2022-11-03 DIAGNOSIS — N13.8 URINARY TRACT OBSTRUCTION DUE TO KIDNEY STONE: ICD-10-CM

## 2022-11-03 DIAGNOSIS — N20.1 LEFT URETERAL STONE: Primary | ICD-10-CM

## 2022-11-03 LAB
BILIRUB UR QL STRIP: NEGATIVE
CAOX CRY URNS QL MICRO: ABNORMAL /HPF
CLARITY UR: ABNORMAL
COLOR UR: ABNORMAL
GLUCOSE UR STRIP-MCNC: NORMAL MG/DL
KETONES UR STRIP-SCNC: NEGATIVE MG/DL
LEUKOCYTE ESTERASE UR QL STRIP: NEGATIVE
MUCOUS, UA: ABNORMAL /LPF
NITRITE UR QL STRIP: NEGATIVE
PH UR STRIP: 5.5 PH UNITS
PROT UR QL STRIP: 30
RBC # UR STRIP: ABNORMAL /UL
RBC #/AREA URNS HPF: 32 /HPF
SP GR UR STRIP: 1.02
UROBILINOGEN UR STRIP-ACNC: NORMAL MG/DL
WBC #/AREA URNS HPF: 12 /HPF

## 2022-11-03 PROCEDURE — 87086 URINE CULTURE/COLONY COUNT: CPT | Mod: ,,, | Performed by: CLINICAL MEDICAL LABORATORY

## 2022-11-03 PROCEDURE — 99215 OFFICE O/P EST HI 40 MIN: CPT | Mod: PBBFAC | Performed by: NURSE PRACTITIONER

## 2022-11-03 PROCEDURE — 3074F SYST BP LT 130 MM HG: CPT | Mod: ,,, | Performed by: NURSE PRACTITIONER

## 2022-11-03 PROCEDURE — 1160F RVW MEDS BY RX/DR IN RCRD: CPT | Mod: ,,, | Performed by: NURSE PRACTITIONER

## 2022-11-03 PROCEDURE — 1159F MED LIST DOCD IN RCRD: CPT | Mod: ,,, | Performed by: NURSE PRACTITIONER

## 2022-11-03 PROCEDURE — 3044F PR MOST RECENT HEMOGLOBIN A1C LEVEL <7.0%: ICD-10-PCS | Mod: ,,, | Performed by: NURSE PRACTITIONER

## 2022-11-03 PROCEDURE — 4010F PR ACE/ARB THEARPY RXD/TAKEN: ICD-10-PCS | Mod: ,,, | Performed by: NURSE PRACTITIONER

## 2022-11-03 PROCEDURE — 87086 CULTURE, URINE: ICD-10-PCS | Mod: ,,, | Performed by: CLINICAL MEDICAL LABORATORY

## 2022-11-03 PROCEDURE — 4010F ACE/ARB THERAPY RXD/TAKEN: CPT | Mod: ,,, | Performed by: NURSE PRACTITIONER

## 2022-11-03 PROCEDURE — 3008F PR BODY MASS INDEX (BMI) DOCUMENTED: ICD-10-PCS | Mod: ,,, | Performed by: NURSE PRACTITIONER

## 2022-11-03 PROCEDURE — 74018 RADEX ABDOMEN 1 VIEW: CPT | Mod: TC

## 2022-11-03 PROCEDURE — 99203 PR OFFICE/OUTPT VISIT, NEW, LEVL III, 30-44 MIN: ICD-10-PCS | Mod: S$PBB,25,, | Performed by: NURSE PRACTITIONER

## 2022-11-03 PROCEDURE — 81001 URINALYSIS: ICD-10-PCS | Mod: ,,, | Performed by: CLINICAL MEDICAL LABORATORY

## 2022-11-03 PROCEDURE — 3074F PR MOST RECENT SYSTOLIC BLOOD PRESSURE < 130 MM HG: ICD-10-PCS | Mod: ,,, | Performed by: NURSE PRACTITIONER

## 2022-11-03 PROCEDURE — 1160F PR REVIEW ALL MEDS BY PRESCRIBER/CLIN PHARMACIST DOCUMENTED: ICD-10-PCS | Mod: ,,, | Performed by: NURSE PRACTITIONER

## 2022-11-03 PROCEDURE — 99203 OFFICE O/P NEW LOW 30 MIN: CPT | Mod: S$PBB,25,, | Performed by: NURSE PRACTITIONER

## 2022-11-03 PROCEDURE — 3078F DIAST BP <80 MM HG: CPT | Mod: ,,, | Performed by: NURSE PRACTITIONER

## 2022-11-03 PROCEDURE — 81001 URINALYSIS AUTO W/SCOPE: CPT | Mod: ,,, | Performed by: CLINICAL MEDICAL LABORATORY

## 2022-11-03 PROCEDURE — 96372 THER/PROPH/DIAG INJ SC/IM: CPT | Mod: PBBFAC | Performed by: NURSE PRACTITIONER

## 2022-11-03 PROCEDURE — 3044F HG A1C LEVEL LT 7.0%: CPT | Mod: ,,, | Performed by: NURSE PRACTITIONER

## 2022-11-03 PROCEDURE — 3078F PR MOST RECENT DIASTOLIC BLOOD PRESSURE < 80 MM HG: ICD-10-PCS | Mod: ,,, | Performed by: NURSE PRACTITIONER

## 2022-11-03 PROCEDURE — 1159F PR MEDICATION LIST DOCUMENTED IN MEDICAL RECORD: ICD-10-PCS | Mod: ,,, | Performed by: NURSE PRACTITIONER

## 2022-11-03 PROCEDURE — 74018 XR KUB: ICD-10-PCS | Mod: 26,,, | Performed by: STUDENT IN AN ORGANIZED HEALTH CARE EDUCATION/TRAINING PROGRAM

## 2022-11-03 PROCEDURE — 74018 RADEX ABDOMEN 1 VIEW: CPT | Mod: 26,,, | Performed by: STUDENT IN AN ORGANIZED HEALTH CARE EDUCATION/TRAINING PROGRAM

## 2022-11-03 PROCEDURE — 3008F BODY MASS INDEX DOCD: CPT | Mod: ,,, | Performed by: NURSE PRACTITIONER

## 2022-11-03 RX ORDER — KETOROLAC TROMETHAMINE 30 MG/ML
60 INJECTION, SOLUTION INTRAMUSCULAR; INTRAVENOUS
Status: COMPLETED | OUTPATIENT
Start: 2022-11-03 | End: 2022-11-03

## 2022-11-03 RX ADMIN — KETOROLAC TROMETHAMINE 60 MG: 60 INJECTION, SOLUTION INTRAMUSCULAR at 03:11

## 2022-11-03 NOTE — ASSESSMENT & PLAN NOTE
· toradol 60 mg IM  · CT Stone protocol 0800 tomorrow morning, will see patient after.  · He will likely have a stent placed since he will need to bee off of Plavix for one week.  · Stone appears to be passable stone.  If not passed, will consider intervention.  · Dr. Villeda agrees with POC

## 2022-11-03 NOTE — TELEPHONE ENCOUNTER
I called and spoke with pt.  Informed him he has appointment with Dr Villeda tomorrow at 9am to review CT Scan results with him.  Dr Villeda has 2 cases scheduled tomorrow so I don't know if he will try to see you in between cases or after the 2nd case, but you may have a little bit of a wait time tomorrow.  Pt voiced understanding.

## 2022-11-03 NOTE — PROGRESS NOTES
"Subjective:       Patient ID: Eligio Felix is a 62 y.o. male.    Chief Complaint: Nephrolithiasis (Pain since Thurs.)    Mr. Felix is a very pleasant 61 yo gentleman who was previously seen by Dr. Mathis for stone history dating back 25 years.  He states history of 3 past shockwave lithotripsies in past years.  He passed his last stone without intervention.  He reports onset of left intermittent flank pain with nausea 6 days ago while he was working in [place].  He has been controlling this pain with NSAIDS.  Patient takes plavix daily, followed by [facility] for history of stents 2014.  UTD on COVID vaccines;  denies problems with prior surgeries.  Pain rated as "6/10" presently.    Lab Results       Component                Value               Date                       CREATININE               1.28                02/07/2022           Dr. Villeda to room to review imaging and evaluate patient.  [1/3/2022]-----------------------------------------------------------------------  Review of Systems   Constitutional: Negative.    HENT: Negative.     Eyes: Negative.    Respiratory: Negative.     Cardiovascular: Negative.    Gastrointestinal: Negative.    Endocrine: Negative.    Genitourinary:  Positive for dysuria, flank pain, frequency, hematuria and urgency. Negative for decreased urine volume, difficulty urinating, enuresis, genital sores, penile discharge, penile pain, penile swelling, scrotal swelling and testicular pain.        Intermittent, irritative voiding complaints.   Allergic/Immunologic: Negative.    Neurological: Negative.    Hematological: Negative.    Psychiatric/Behavioral: Negative.       Objective:      Physical Exam  Vitals and nursing note reviewed.   Constitutional:       General: He is not in acute distress.     Appearance: Normal appearance. He is not ill-appearing, toxic-appearing or diaphoretic.   HENT:      Head: Normocephalic.      Nose: Nose normal.      Mouth/Throat:      " Mouth: Mucous membranes are moist.      Pharynx: Oropharynx is clear.   Eyes:      General: No scleral icterus.     Conjunctiva/sclera: Conjunctivae normal.      Pupils: Pupils are equal, round, and reactive to light.   Cardiovascular:      Rate and Rhythm: Normal rate and regular rhythm.      Pulses: Normal pulses.      Heart sounds: Normal heart sounds. No murmur heard.  Pulmonary:      Effort: Pulmonary effort is normal.      Breath sounds: Normal breath sounds.   Abdominal:      General: Bowel sounds are normal. There is no distension.      Palpations: Abdomen is soft. There is no mass.      Tenderness: There is no abdominal tenderness. There is no right CVA tenderness, left CVA tenderness or guarding.   Musculoskeletal:         General: Normal range of motion.      Cervical back: Normal range of motion and neck supple.      Right lower leg: No edema.      Left lower leg: No edema.   Skin:     General: Skin is warm and dry.      Capillary Refill: Capillary refill takes less than 2 seconds.      Coloration: Skin is not jaundiced or pale.      Findings: No bruising, erythema, lesion or rash.   Neurological:      General: No focal deficit present.      Mental Status: He is alert and oriented to person, place, and time.   Psychiatric:         Mood and Affect: Mood normal.         Behavior: Behavior normal.         Thought Content: Thought content normal.         Judgment: Judgment normal.       Assessment:       1. Left ureteral stone    2. Hydronephrosis of left kidney    3. Urinary tract obstruction due to kidney stone    4. Kidney stones    5. Hydronephrosis, left    6. Hematuria, unspecified type    7. Preop examination    8. Left lower quadrant abdominal pain          Plan:       Left ureteral stone  toradol 60 mg IM  CT Stone protocol 0800 tomorrow morning, will see patient after.  He will likely have a stent placed since he will need to bee off of Plavix for one week.  Stone appears to be passable stone.  If  not passed, will consider intervention.  Dr. Villeda agrees with POC    Hydronephrosis, left  See ureteral stone POC

## 2022-11-04 ENCOUNTER — OFFICE VISIT (OUTPATIENT)
Dept: UROLOGY | Facility: CLINIC | Age: 62
End: 2022-11-04
Payer: COMMERCIAL

## 2022-11-04 ENCOUNTER — HOSPITAL ENCOUNTER (OUTPATIENT)
Dept: RADIOLOGY | Facility: HOSPITAL | Age: 62
Discharge: HOME | End: 2022-11-04
Attending: NURSE PRACTITIONER
Payer: COMMERCIAL

## 2022-11-04 VITALS
WEIGHT: 233 LBS | DIASTOLIC BLOOD PRESSURE: 90 MMHG | BODY MASS INDEX: 35.31 KG/M2 | SYSTOLIC BLOOD PRESSURE: 149 MMHG | HEIGHT: 68 IN | HEART RATE: 88 BPM

## 2022-11-04 DIAGNOSIS — N20.0 RECURRENT NEPHROLITHIASIS: ICD-10-CM

## 2022-11-04 DIAGNOSIS — R10.32 LEFT LOWER QUADRANT ABDOMINAL PAIN: ICD-10-CM

## 2022-11-04 DIAGNOSIS — N23 RENAL COLIC ON LEFT SIDE: ICD-10-CM

## 2022-11-04 DIAGNOSIS — R31.29 MICROSCOPIC HEMATURIA: ICD-10-CM

## 2022-11-04 DIAGNOSIS — N20.0 LEFT RENAL STONE: Primary | ICD-10-CM

## 2022-11-04 PROCEDURE — 99214 PR OFFICE/OUTPT VISIT, EST, LEVL IV, 30-39 MIN: ICD-10-PCS | Mod: S$PBB,,, | Performed by: UROLOGY

## 2022-11-04 PROCEDURE — 1159F PR MEDICATION LIST DOCUMENTED IN MEDICAL RECORD: ICD-10-PCS | Mod: ,,, | Performed by: UROLOGY

## 2022-11-04 PROCEDURE — 99214 OFFICE O/P EST MOD 30 MIN: CPT | Mod: S$PBB,,, | Performed by: UROLOGY

## 2022-11-04 PROCEDURE — 1160F PR REVIEW ALL MEDS BY PRESCRIBER/CLIN PHARMACIST DOCUMENTED: ICD-10-PCS | Mod: ,,, | Performed by: UROLOGY

## 2022-11-04 PROCEDURE — 1160F RVW MEDS BY RX/DR IN RCRD: CPT | Mod: ,,, | Performed by: UROLOGY

## 2022-11-04 PROCEDURE — 74176 CT ABD & PELVIS W/O CONTRAST: CPT | Mod: TC

## 2022-11-04 PROCEDURE — 3080F PR MOST RECENT DIASTOLIC BLOOD PRESSURE >= 90 MM HG: ICD-10-PCS | Mod: ,,, | Performed by: UROLOGY

## 2022-11-04 PROCEDURE — 1159F MED LIST DOCD IN RCRD: CPT | Mod: ,,, | Performed by: UROLOGY

## 2022-11-04 PROCEDURE — 3008F BODY MASS INDEX DOCD: CPT | Mod: ,,, | Performed by: UROLOGY

## 2022-11-04 PROCEDURE — 3077F SYST BP >= 140 MM HG: CPT | Mod: ,,, | Performed by: UROLOGY

## 2022-11-04 PROCEDURE — 3077F PR MOST RECENT SYSTOLIC BLOOD PRESSURE >= 140 MM HG: ICD-10-PCS | Mod: ,,, | Performed by: UROLOGY

## 2022-11-04 PROCEDURE — 74176 CT ABD & PELVIS W/O CONTRAST: CPT | Mod: 26,,, | Performed by: RADIOLOGY

## 2022-11-04 PROCEDURE — 4010F PR ACE/ARB THEARPY RXD/TAKEN: ICD-10-PCS | Mod: ,,, | Performed by: UROLOGY

## 2022-11-04 PROCEDURE — 3008F PR BODY MASS INDEX (BMI) DOCUMENTED: ICD-10-PCS | Mod: ,,, | Performed by: UROLOGY

## 2022-11-04 PROCEDURE — 3044F PR MOST RECENT HEMOGLOBIN A1C LEVEL <7.0%: ICD-10-PCS | Mod: ,,, | Performed by: UROLOGY

## 2022-11-04 PROCEDURE — 3044F HG A1C LEVEL LT 7.0%: CPT | Mod: ,,, | Performed by: UROLOGY

## 2022-11-04 PROCEDURE — 99214 OFFICE O/P EST MOD 30 MIN: CPT | Mod: PBBFAC,25 | Performed by: UROLOGY

## 2022-11-04 PROCEDURE — 4010F ACE/ARB THERAPY RXD/TAKEN: CPT | Mod: ,,, | Performed by: UROLOGY

## 2022-11-04 PROCEDURE — 3080F DIAST BP >= 90 MM HG: CPT | Mod: ,,, | Performed by: UROLOGY

## 2022-11-04 PROCEDURE — 74176 CT ABDOMEN PELVIS WITHOUT CONTRAST: ICD-10-PCS | Mod: 26,,, | Performed by: RADIOLOGY

## 2022-11-04 RX ORDER — HYDROMORPHONE HYDROCHLORIDE 2 MG/1
2 TABLET ORAL EVERY 4 HOURS PRN
Qty: 20 TABLET | Refills: 0 | Status: SHIPPED | OUTPATIENT
Start: 2022-11-04

## 2022-11-04 RX ORDER — PROMETHAZINE HYDROCHLORIDE 25 MG/1
25 TABLET ORAL EVERY 4 HOURS PRN
Qty: 20 TABLET | Refills: 0 | Status: SHIPPED | OUTPATIENT
Start: 2022-11-04

## 2022-11-04 NOTE — PROGRESS NOTES
"Subjective:       Patient ID: Eligio Felix is a 62 y.o. male.    Chief Complaint: Follow-up (Renal colic CT)       Mr. Felix is a very pleasant 63 yo gentleman who was previously seen by Dr. Mathis for stone history dating back 25 years.  He states history of 3 past shockwave lithotripsies in past years.  He passed his last stone without intervention.  He reports onset of left intermittent flank pain with nausea 6 days ago while he was working in [place].  He has been controlling this pain with NSAIDS.  Patient takes plavix daily, followed by [facility] for history of stents 2014.  UTD on COVID vaccines;  denies problems with prior surgeries.  Pain rated as "6/10" presently.    Lab Results       Component                Value               Date                       CREATININE               1.28                02/07/2022            Dr. Villeda to room to review imaging and evaluate patient.  [1/3/2022]-----------------------------------------------------------------------  Review of Systems   Constitutional: Negative.    HENT: Negative.     Eyes: Negative.    Respiratory: Negative.     Cardiovascular: Negative.    Gastrointestinal: Negative.    Endocrine: Negative.    Genitourinary:  Positive for dysuria, flank pain, frequency, hematuria and urgency. Negative for decreased urine volume, difficulty urinating, enuresis, genital sores, penile discharge, penile pain, penile swelling, scrotal swelling and testicular pain.        Intermittent, irritative voiding complaints.   Allergic/Immunologic: Negative.    Neurological: Negative.    Hematological: Negative.    Psychiatric/Behavioral: Negative.         Objective:  Physical Exam  Vitals and nursing note reviewed.   Constitutional:       General: He is not in acute distress.     Appearance: Normal appearance. He is not ill-appearing, toxic-appearing or diaphoretic.   HENT:      Head: Normocephalic.      Nose: Nose normal.      Mouth/Throat:      Mouth: " Mucous membranes are moist.      Pharynx: Oropharynx is clear.   Eyes:      General: No scleral icterus.     Conjunctiva/sclera: Conjunctivae normal.      Pupils: Pupils are equal, round, and reactive to light.   Cardiovascular:      Rate and Rhythm: Normal rate and regular rhythm.      Pulses: Normal pulses.      Heart sounds: Normal heart sounds. No murmur heard.  Pulmonary:      Effort: Pulmonary effort is normal.      Breath sounds: Normal breath sounds.   Abdominal:      General: Bowel sounds are normal. There is no distension.      Palpations: Abdomen is soft. There is no mass.      Tenderness: There is no abdominal tenderness. There is no right CVA tenderness, left CVA tenderness or guarding.   Musculoskeletal:         General: Normal range of motion.      Cervical back: Normal range of motion and neck supple.      Right lower leg: No edema.      Left lower leg: No edema.   Skin:     General: Skin is warm and dry.      Capillary Refill: Capillary refill takes less than 2 seconds.      Coloration: Skin is not jaundiced or pale.      Findings: No bruising, erythema, lesion or rash.   Neurological:      General: No focal deficit present.      Mental Status: He is alert and oriented to person, place, and time.   Psychiatric:         Mood and Affect: Mood normal.         Behavior: Behavior normal.         Thought Content: Thought content normal.         Judgment: Judgment normal.         Assessment:        1. Left ureteral stone   2. Hydronephrosis of left kidney   3. Urinary tract obstruction due to kidney stone   4. Kidney stones   5. Hydronephrosis, left   6. Hematuria, unspecified type   7. Preop examination   8. Left lower quadrant abdominal pain        Plan:      Left ureteral stone  · toradol 60 mg IM  · CT Stone protocol 0800 tomorrow morning, will see patient after.  · He will likely have a stent placed since he will need to bee off of Plavix for one week.  · Stone appears to be passable stone.  If not  passed, will consider intervention.  · Dr. Villeda agrees with POC     Hydronephrosis, left  · See ureteral stone POC          Electronically signed by SHEREEN Chan at 11/3/2022  4:16 PM  ---------------------------------------------------------------------------------------------------------------------------------------------------------------------------------------------------------------------------------------------------------------------------------  The above note is note of Ms. Mora.  Patient was seen with her yesterday.  Returned today for follow-up CT scan and decision about what we need to do with stone at the left ureteropelvic junction.  He is not having any pain currently and  feels normal.  He does not have to go back to work in [place] until after Thanksgiving.  Patient in with his wife.  He seems to be feeling fine today.  CT reviewed with patient and his wife and the stone that is about a 5 mm stone is present at the left ureteropelvic junction.  There is a dilated left renal pelvis that is presumably chronic although may be worse from the acute stone.  There is fairly good parenchyma of the left kidney that looks essentially the same as the parenchyma of right kidney.  [November 4, 2022]          Review of Systems      Objective:      Physical Exam  Constitutional:       Appearance: Normal appearance. He is normal weight.   Neurological:      Mental Status: He is alert.   Psychiatric:         Mood and Affect: Mood normal.         Behavior: Behavior normal.     Urinalysis by me today reveals several red blood cells with occasional pus.  Dipstick had moderate amount of blood, trace of protein, trace of leukocytes, pH 5.5, and specific gravity 1.030  Assessment:       Problem List Items Addressed This Visit    None  Visit Diagnoses       Left renal stone    -  Primary    Relevant Orders    X-Ray KUB    Renal colic on left side        Microscopic hematuria        Recurrent  nephrolithiasis                  Plan:           Reviewed CT with patient.  Discussed treatment options including continued conservative treatment, stent insertion, ureterorenoscopy with laser lithotripsy of stone, or percutaneous nephrostolithotomy.  Patient does not have to return to work for a few weeks so he chooses at this time to continue conservative treatment and see if the stone will move down the ureter some where ureteroscopy would be more practical.  He will be seen in about 2 weeks with a KUB.    Return sooner for acute colic or other problems.  I have given him prescription for Phenergan and Dilaudid to use when he has recurrent pain as he only has hydrocodone 7.5 at present.

## 2022-11-05 LAB — UA COMPLETE W REFLEX CULTURE PNL UR: NO GROWTH

## 2022-11-05 NOTE — PATIENT INSTRUCTIONS
Reviewed CT with patient.  Discussed treatment options including continued conservative treatment, stent insertion, ureterorenoscopy with laser lithotripsy of stone, or percutaneous nephrostolithotomy.  Patient does not have to return to work for a few weeks so he chooses at this time to continue conservative treatment and see if the stone will move down the ureter some where ureteroscopy would be more practical.  He will be seen in about 2 weeks with a KUB.    Return sooner for acute colic or other problems.  I have given him prescription for Phenergan and Dilaudid to use when he has recurrent pain as he only has hydrocodone 7.5 at present.

## 2022-11-15 ENCOUNTER — OFFICE VISIT (OUTPATIENT)
Dept: PRIMARY CARE CLINIC | Facility: CLINIC | Age: 62
End: 2022-11-15
Payer: COMMERCIAL

## 2022-11-15 VITALS
HEIGHT: 68 IN | WEIGHT: 236 LBS | DIASTOLIC BLOOD PRESSURE: 88 MMHG | RESPIRATION RATE: 17 BRPM | TEMPERATURE: 99 F | BODY MASS INDEX: 35.77 KG/M2 | OXYGEN SATURATION: 97 % | HEART RATE: 88 BPM | SYSTOLIC BLOOD PRESSURE: 138 MMHG

## 2022-11-15 DIAGNOSIS — J20.9 ACUTE BRONCHITIS, UNSPECIFIED ORGANISM: Primary | ICD-10-CM

## 2022-11-15 PROCEDURE — 96372 PR INJECTION,THERAP/PROPH/DIAG2ST, IM OR SUBCUT: ICD-10-PCS | Mod: ,,, | Performed by: NURSE PRACTITIONER

## 2022-11-15 PROCEDURE — 3044F PR MOST RECENT HEMOGLOBIN A1C LEVEL <7.0%: ICD-10-PCS | Mod: ,,, | Performed by: NURSE PRACTITIONER

## 2022-11-15 PROCEDURE — 4010F PR ACE/ARB THEARPY RXD/TAKEN: ICD-10-PCS | Mod: ,,, | Performed by: NURSE PRACTITIONER

## 2022-11-15 PROCEDURE — 3008F BODY MASS INDEX DOCD: CPT | Mod: ,,, | Performed by: NURSE PRACTITIONER

## 2022-11-15 PROCEDURE — 3075F PR MOST RECENT SYSTOLIC BLOOD PRESS GE 130-139MM HG: ICD-10-PCS | Mod: ,,, | Performed by: NURSE PRACTITIONER

## 2022-11-15 PROCEDURE — 3079F PR MOST RECENT DIASTOLIC BLOOD PRESSURE 80-89 MM HG: ICD-10-PCS | Mod: ,,, | Performed by: NURSE PRACTITIONER

## 2022-11-15 PROCEDURE — 3075F SYST BP GE 130 - 139MM HG: CPT | Mod: ,,, | Performed by: NURSE PRACTITIONER

## 2022-11-15 PROCEDURE — 96372 THER/PROPH/DIAG INJ SC/IM: CPT | Mod: ,,, | Performed by: NURSE PRACTITIONER

## 2022-11-15 PROCEDURE — 99213 OFFICE O/P EST LOW 20 MIN: CPT | Mod: 25,,, | Performed by: NURSE PRACTITIONER

## 2022-11-15 PROCEDURE — 3079F DIAST BP 80-89 MM HG: CPT | Mod: ,,, | Performed by: NURSE PRACTITIONER

## 2022-11-15 PROCEDURE — 3008F PR BODY MASS INDEX (BMI) DOCUMENTED: ICD-10-PCS | Mod: ,,, | Performed by: NURSE PRACTITIONER

## 2022-11-15 PROCEDURE — 3044F HG A1C LEVEL LT 7.0%: CPT | Mod: ,,, | Performed by: NURSE PRACTITIONER

## 2022-11-15 PROCEDURE — 4010F ACE/ARB THERAPY RXD/TAKEN: CPT | Mod: ,,, | Performed by: NURSE PRACTITIONER

## 2022-11-15 PROCEDURE — 99213 PR OFFICE/OUTPT VISIT, EST, LEVL III, 20-29 MIN: ICD-10-PCS | Mod: 25,,, | Performed by: NURSE PRACTITIONER

## 2022-11-15 RX ORDER — METHYLPREDNISOLONE ACETATE 40 MG/ML
40 INJECTION, SUSPENSION INTRA-ARTICULAR; INTRALESIONAL; INTRAMUSCULAR; SOFT TISSUE
Status: COMPLETED | OUTPATIENT
Start: 2022-11-15 | End: 2022-11-15

## 2022-11-15 RX ORDER — DEXAMETHASONE SODIUM PHOSPHATE 4 MG/ML
4 INJECTION, SOLUTION INTRA-ARTICULAR; INTRALESIONAL; INTRAMUSCULAR; INTRAVENOUS; SOFT TISSUE
Status: COMPLETED | OUTPATIENT
Start: 2022-11-15 | End: 2022-11-15

## 2022-11-15 RX ORDER — PROMETHAZINE HYDROCHLORIDE AND DEXTROMETHORPHAN HYDROBROMIDE 6.25; 15 MG/5ML; MG/5ML
5 SYRUP ORAL EVERY 8 HOURS PRN
Qty: 240 ML | Refills: 0 | Status: SHIPPED | OUTPATIENT
Start: 2022-11-15 | End: 2022-11-25

## 2022-11-15 RX ADMIN — DEXAMETHASONE SODIUM PHOSPHATE 4 MG: 4 INJECTION, SOLUTION INTRA-ARTICULAR; INTRALESIONAL; INTRAMUSCULAR; INTRAVENOUS; SOFT TISSUE at 11:11

## 2022-11-15 RX ADMIN — METHYLPREDNISOLONE ACETATE 40 MG: 40 INJECTION, SUSPENSION INTRA-ARTICULAR; INTRALESIONAL; INTRAMUSCULAR; SOFT TISSUE at 11:11

## 2022-11-15 NOTE — PROGRESS NOTES
Subjective:       Patient ID: Eligio Felix is a 62 y.o. male.    Chief Complaint: Nasal Congestion and Cough    Cough  Pertinent negatives include no chest pain, eye redness, fever, headaches, postnasal drip, rash, sore throat, shortness of breath or wheezing.    Eligio Felix presents today with 4 day history of dry cough and nasal congestion. Denies fever, chills, body aches.    Review of Systems   Constitutional:  Negative for fatigue, fever and unexpected weight change.   HENT:  Positive for nasal congestion and sinus pressure/congestion. Negative for postnasal drip and sore throat.    Eyes:  Negative for pain and redness.   Respiratory:  Positive for cough. Negative for shortness of breath and wheezing.    Cardiovascular:  Negative for chest pain and leg swelling.   Gastrointestinal:  Negative for abdominal pain, constipation, diarrhea and nausea.   Genitourinary:  Negative for dysuria and hematuria.   Musculoskeletal:  Negative for gait problem.   Integumentary:  Negative for color change and rash.   Neurological:  Negative for vertigo, syncope and headaches.       Objective:      Physical Exam  Constitutional:       General: He is not in acute distress.     Appearance: Normal appearance. He is not ill-appearing.   HENT:      Head: Normocephalic.      Nose: Congestion present.      Mouth/Throat:      Pharynx: Posterior oropharyngeal erythema present.   Eyes:      Conjunctiva/sclera: Conjunctivae normal.      Pupils: Pupils are equal, round, and reactive to light.   Cardiovascular:      Rate and Rhythm: Normal rate and regular rhythm.      Heart sounds: No murmur heard.  Pulmonary:      Effort: Pulmonary effort is normal. No respiratory distress.      Breath sounds: Normal breath sounds. No wheezing, rhonchi or rales.   Abdominal:      General: Bowel sounds are normal.      Palpations: Abdomen is soft.      Tenderness: There is no abdominal tenderness.   Musculoskeletal:         General:  No swelling. Normal range of motion.      Cervical back: Neck supple. No rigidity or tenderness.      Right lower leg: No edema.      Left lower leg: No edema.   Skin:     General: Skin is warm and dry.   Neurological:      General: No focal deficit present.      Mental Status: He is alert and oriented to person, place, and time.      Cranial Nerves: No cranial nerve deficit.       Assessment:       Problem List Items Addressed This Visit    None  Visit Diagnoses       Acute bronchitis, unspecified organism    -  Primary    Relevant Medications    dexAMETHasone injection 4 mg (Completed)    methylPREDNISolone acetate injection 40 mg (Completed)            Plan:       Decadron, depo-medrol IM  Promethazine-DM PRN cough  Push PO fluid intake  Notify clinic if s/s persist or worsen

## 2022-11-18 ENCOUNTER — TELEPHONE (OUTPATIENT)
Dept: PRIMARY CARE CLINIC | Facility: CLINIC | Age: 62
End: 2022-11-18
Payer: COMMERCIAL

## 2022-11-18 RX ORDER — CEFDINIR 300 MG/1
300 CAPSULE ORAL 2 TIMES DAILY
Qty: 20 CAPSULE | Refills: 0 | Status: SHIPPED | OUTPATIENT
Start: 2022-11-18 | End: 2022-11-29

## 2022-11-18 NOTE — TELEPHONE ENCOUNTER
----- Message from Anna Willson sent at 11/17/2022  1:39 PM CST -----  Regarding: SINUS INFECTION NOT ANY BETTER  Caller patient wife [phone]  Was told to call if sinus infection not any better  Please send rx for antibiotic to [facility]    thanks

## 2022-11-21 ENCOUNTER — OFFICE VISIT (OUTPATIENT)
Dept: UROLOGY | Facility: CLINIC | Age: 62
End: 2022-11-21
Payer: COMMERCIAL

## 2022-11-21 ENCOUNTER — HOSPITAL ENCOUNTER (OUTPATIENT)
Dept: RADIOLOGY | Facility: HOSPITAL | Age: 62
Discharge: HOME | End: 2022-11-21
Attending: UROLOGY
Payer: COMMERCIAL

## 2022-11-21 VITALS
DIASTOLIC BLOOD PRESSURE: 88 MMHG | SYSTOLIC BLOOD PRESSURE: 150 MMHG | HEART RATE: 79 BPM | BODY MASS INDEX: 35.46 KG/M2 | WEIGHT: 234 LBS | HEIGHT: 68 IN

## 2022-11-21 DIAGNOSIS — N20.0 KIDNEY STONES: ICD-10-CM

## 2022-11-21 DIAGNOSIS — N20.1 LEFT URETERAL STONE: Primary | ICD-10-CM

## 2022-11-21 DIAGNOSIS — N23 URETERAL COLIC: ICD-10-CM

## 2022-11-21 DIAGNOSIS — N20.0 LEFT RENAL STONE: ICD-10-CM

## 2022-11-21 DIAGNOSIS — R31.29 MICROSCOPIC HEMATURIA: ICD-10-CM

## 2022-11-21 PROCEDURE — 3079F DIAST BP 80-89 MM HG: CPT | Mod: ,,, | Performed by: UROLOGY

## 2022-11-21 PROCEDURE — 1160F RVW MEDS BY RX/DR IN RCRD: CPT | Mod: ,,, | Performed by: UROLOGY

## 2022-11-21 PROCEDURE — 99215 OFFICE O/P EST HI 40 MIN: CPT | Mod: PBBFAC | Performed by: UROLOGY

## 2022-11-21 PROCEDURE — 3077F PR MOST RECENT SYSTOLIC BLOOD PRESSURE >= 140 MM HG: ICD-10-PCS | Mod: ,,, | Performed by: UROLOGY

## 2022-11-21 PROCEDURE — 4010F PR ACE/ARB THEARPY RXD/TAKEN: ICD-10-PCS | Mod: ,,, | Performed by: UROLOGY

## 2022-11-21 PROCEDURE — 3008F BODY MASS INDEX DOCD: CPT | Mod: ,,, | Performed by: UROLOGY

## 2022-11-21 PROCEDURE — 74018 RADEX ABDOMEN 1 VIEW: CPT | Mod: TC

## 2022-11-21 PROCEDURE — 74018 RADEX ABDOMEN 1 VIEW: CPT | Mod: 26,,, | Performed by: STUDENT IN AN ORGANIZED HEALTH CARE EDUCATION/TRAINING PROGRAM

## 2022-11-21 PROCEDURE — 1159F MED LIST DOCD IN RCRD: CPT | Mod: ,,, | Performed by: UROLOGY

## 2022-11-21 PROCEDURE — 3044F HG A1C LEVEL LT 7.0%: CPT | Mod: ,,, | Performed by: UROLOGY

## 2022-11-21 PROCEDURE — 3077F SYST BP >= 140 MM HG: CPT | Mod: ,,, | Performed by: UROLOGY

## 2022-11-21 PROCEDURE — 1159F PR MEDICATION LIST DOCUMENTED IN MEDICAL RECORD: ICD-10-PCS | Mod: ,,, | Performed by: UROLOGY

## 2022-11-21 PROCEDURE — 74018 XR KUB: ICD-10-PCS | Mod: 26,,, | Performed by: STUDENT IN AN ORGANIZED HEALTH CARE EDUCATION/TRAINING PROGRAM

## 2022-11-21 PROCEDURE — 3079F PR MOST RECENT DIASTOLIC BLOOD PRESSURE 80-89 MM HG: ICD-10-PCS | Mod: ,,, | Performed by: UROLOGY

## 2022-11-21 PROCEDURE — 1160F PR REVIEW ALL MEDS BY PRESCRIBER/CLIN PHARMACIST DOCUMENTED: ICD-10-PCS | Mod: ,,, | Performed by: UROLOGY

## 2022-11-21 PROCEDURE — 99213 OFFICE O/P EST LOW 20 MIN: CPT | Mod: S$PBB,,, | Performed by: UROLOGY

## 2022-11-21 PROCEDURE — 3044F PR MOST RECENT HEMOGLOBIN A1C LEVEL <7.0%: ICD-10-PCS | Mod: ,,, | Performed by: UROLOGY

## 2022-11-21 PROCEDURE — 99213 PR OFFICE/OUTPT VISIT, EST, LEVL III, 20-29 MIN: ICD-10-PCS | Mod: S$PBB,,, | Performed by: UROLOGY

## 2022-11-21 PROCEDURE — 4010F ACE/ARB THERAPY RXD/TAKEN: CPT | Mod: ,,, | Performed by: UROLOGY

## 2022-11-21 PROCEDURE — 3008F PR BODY MASS INDEX (BMI) DOCUMENTED: ICD-10-PCS | Mod: ,,, | Performed by: UROLOGY

## 2022-11-21 RX ORDER — ROSUVASTATIN CALCIUM 5 MG/1
5 TABLET, COATED ORAL NIGHTLY
COMMUNITY
End: 2022-11-28 | Stop reason: SDUPTHER

## 2022-11-21 NOTE — PROGRESS NOTES
"Subjective:       Patient ID: Eligio Felix is a 62 y.o. male.    Chief Complaint: Follow-up (2 week f/u KUB, bilateral renal stone, having pain on left side)       Mr. Felix is a very pleasant 61 yo gentleman who was previously seen by Dr. Mathis for stone history dating back 25 years.  He states history of 3 past shockwave lithotripsies in past years.  He passed his last stone without intervention.  He reports onset of left intermittent flank pain with nausea 6 days ago while he was working in [place].  He has been controlling this pain with NSAIDS.  Patient takes plavix daily, followed by [facility] for history of stents 2014.  UTD on COVID vaccines;  denies problems with prior surgeries.  Pain rated as "6/10" presently.    Lab Results       Component                Value               Date                       CREATININE               1.28                02/07/2022            Dr. Villeda to room to review imaging and evaluate patient.  [1/3/2022]-----------------------------------------------------------------------  Review of Systems   Constitutional: Negative.    HENT: Negative.     Eyes: Negative.    Respiratory: Negative.     Cardiovascular: Negative.    Gastrointestinal: Negative.    Endocrine: Negative.    Genitourinary:  Positive for dysuria, flank pain, frequency, hematuria and urgency. Negative for decreased urine volume, difficulty urinating, enuresis, genital sores, penile discharge, penile pain, penile swelling, scrotal swelling and testicular pain.        Intermittent, irritative voiding complaints.   Allergic/Immunologic: Negative.    Neurological: Negative.    Hematological: Negative.    Psychiatric/Behavioral: Negative.          Objective:  Physical Exam  Vitals and nursing note reviewed.   Constitutional:       General: He is not in acute distress.     Appearance: Normal appearance. He is not ill-appearing, toxic-appearing or diaphoretic.   HENT:      Head: Normocephalic.      " Nose: Nose normal.      Mouth/Throat:      Mouth: Mucous membranes are moist.      Pharynx: Oropharynx is clear.   Eyes:      General: No scleral icterus.     Conjunctiva/sclera: Conjunctivae normal.      Pupils: Pupils are equal, round, and reactive to light.   Cardiovascular:      Rate and Rhythm: Normal rate and regular rhythm.      Pulses: Normal pulses.      Heart sounds: Normal heart sounds. No murmur heard.  Pulmonary:      Effort: Pulmonary effort is normal.      Breath sounds: Normal breath sounds.   Abdominal:      General: Bowel sounds are normal. There is no distension.      Palpations: Abdomen is soft. There is no mass.      Tenderness: There is no abdominal tenderness. There is no right CVA tenderness, left CVA tenderness or guarding.   Musculoskeletal:         General: Normal range of motion.      Cervical back: Normal range of motion and neck supple.      Right lower leg: No edema.      Left lower leg: No edema.   Skin:     General: Skin is warm and dry.      Capillary Refill: Capillary refill takes less than 2 seconds.      Coloration: Skin is not jaundiced or pale.      Findings: No bruising, erythema, lesion or rash.   Neurological:      General: No focal deficit present.      Mental Status: He is alert and oriented to person, place, and time.   Psychiatric:         Mood and Affect: Mood normal.         Behavior: Behavior normal.         Thought Content: Thought content normal.         Judgment: Judgment normal.          Assessment:         1.         Left ureteral stone   2.         Hydronephrosis of left kidney   3.         Urinary tract obstruction due to kidney stone   4.         Kidney stones   5.         Hydronephrosis, left   6.         Hematuria, unspecified type   7.         Preop examination   8.         Left lower quadrant abdominal pain         Plan:      Left ureteral stone  ·           toradol 60 mg IM  ·           CT Stone protocol 0800 tomorrow morning, will see patient after.  ·            He will likely have a stent placed since he will need to bee off of Plavix for one week.  ·           Stone appears to be passable stone.  If not passed, will consider intervention.  ·           Dr. Villeda agrees with POC     Hydronephrosis, left  ·           See ureteral stone POC           Electronically signed by SHEREEN Chan at 11/3/2022  4:16 PM  ---------------------------------------------------------------------------------------------------------------------------------------------------------------------------------------------------------------------------------------------------------------------------------  The above note is note of Ms. Mora.  Patient was seen with her yesterday.  Returned today for follow-up CT scan and decision about what we need to do with stone at the left ureteropelvic junction.  He is not having any pain currently and  feels normal.  He does not have to go back to work in [place] until after Thanksgiving.  Patient in with his wife.  He seems to be feeling fine today.  CT reviewed with patient and his wife and the stone that is about a 5 mm stone is present at the left ureteropelvic junction.  There is a dilated left renal pelvis that is presumably chronic although may be worse from the acute stone.  There is fairly good parenchyma of the left kidney that looks essentially the same as the parenchyma of right kidney.  [November 4, 2022]     Mr. Felix is in for follow-up of the left ureteral stone that was found on previous CT.  He has required pain medicine only 1 time in the last 2 weeks.  Has not had any pain in last several days.  He has had some low back pain on left side off and on but severe pain only once.  He has not been aware of stone passing.  Feeling fine today.  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 [  November 21, 2022]  Review of Systems      Objective:      Physical Exam  Constitutional:       Appearance: Normal  appearance. He is normal weight.   Abdominal:      Tenderness: There is no right CVA tenderness or left CVA tenderness.   Neurological:      Mental Status: He is alert.   Psychiatric:         Mood and Affect: Mood normal.         Behavior: Behavior normal.      Urinalysis shows several red blood cells with a few pus.  Dipstick had 3+ blood, 2+ protein, pH of 6.0 and specific gravity 1.030  Assessment:       Problem List Items Addressed This Visit          Renal/    Kidney stones    Relevant Orders    X-Ray KUB    Left ureteral stone - Primary     Other Visit Diagnoses       Microscopic hematuria        Ureteral colic                Plan:        KUB reviewed with patient.  I think the stone that was present at the level of L3   previously is absent and there is a calcification on the left side of the pelvis that was not obviously present before.  I think the stone is now  in the distal left ureter most likely.   Patient would like to continue conservative treatment and I think that is the proper thing for him to do.    We will see him again December 21st  with KUB or sooner if needed.   He is to strain his urine regularly and see if he passes the stone.

## 2022-11-22 NOTE — PATIENT INSTRUCTIONS
KUB reviewed with patient.  I think the stone that was present at the level of L3   previously is absent and there is a calcification on the left side of the pelvis that was not obviously present before.  I think the stone is now  in the distal left ureter most likely.   Patient would like to continue conservative treatment and I think that is the proper thing for him to do.    We will see him again December 21st  with KUB or sooner if needed.   He is to strain his urine regularly and see if he passes the stone.

## 2022-11-23 DIAGNOSIS — Z13.220 SCREENING FOR LIPOID DISORDERS: Primary | ICD-10-CM

## 2022-11-23 DIAGNOSIS — Z13.1 SCREENING FOR DIABETES MELLITUS: ICD-10-CM

## 2022-11-28 RX ORDER — ROSUVASTATIN CALCIUM 5 MG/1
5 TABLET, COATED ORAL NIGHTLY
Qty: 90 TABLET | Refills: 1 | Status: SHIPPED | OUTPATIENT
Start: 2022-11-28 | End: 2023-02-14 | Stop reason: DRUGHIGH

## 2022-11-29 ENCOUNTER — OFFICE VISIT (OUTPATIENT)
Dept: PRIMARY CARE CLINIC | Facility: CLINIC | Age: 62
End: 2022-11-29
Payer: COMMERCIAL

## 2022-11-29 VITALS
OXYGEN SATURATION: 98 % | HEART RATE: 78 BPM | WEIGHT: 235 LBS | SYSTOLIC BLOOD PRESSURE: 134 MMHG | DIASTOLIC BLOOD PRESSURE: 72 MMHG | TEMPERATURE: 98 F | RESPIRATION RATE: 16 BRPM | HEIGHT: 68 IN | BODY MASS INDEX: 35.61 KG/M2

## 2022-11-29 DIAGNOSIS — Z00.00 ROUTINE GENERAL MEDICAL EXAMINATION AT A HEALTH CARE FACILITY: Primary | ICD-10-CM

## 2022-11-29 PROCEDURE — 1159F MED LIST DOCD IN RCRD: CPT | Mod: ,,, | Performed by: NURSE PRACTITIONER

## 2022-11-29 PROCEDURE — 99396 PR PREVENTIVE VISIT,EST,40-64: ICD-10-PCS | Mod: ,,, | Performed by: NURSE PRACTITIONER

## 2022-11-29 PROCEDURE — 3075F PR MOST RECENT SYSTOLIC BLOOD PRESS GE 130-139MM HG: ICD-10-PCS | Mod: ,,, | Performed by: NURSE PRACTITIONER

## 2022-11-29 PROCEDURE — 3044F HG A1C LEVEL LT 7.0%: CPT | Mod: ,,, | Performed by: NURSE PRACTITIONER

## 2022-11-29 PROCEDURE — 3044F PR MOST RECENT HEMOGLOBIN A1C LEVEL <7.0%: ICD-10-PCS | Mod: ,,, | Performed by: NURSE PRACTITIONER

## 2022-11-29 PROCEDURE — 4010F PR ACE/ARB THEARPY RXD/TAKEN: ICD-10-PCS | Mod: ,,, | Performed by: NURSE PRACTITIONER

## 2022-11-29 PROCEDURE — 3075F SYST BP GE 130 - 139MM HG: CPT | Mod: ,,, | Performed by: NURSE PRACTITIONER

## 2022-11-29 PROCEDURE — 3078F DIAST BP <80 MM HG: CPT | Mod: ,,, | Performed by: NURSE PRACTITIONER

## 2022-11-29 PROCEDURE — 3078F PR MOST RECENT DIASTOLIC BLOOD PRESSURE < 80 MM HG: ICD-10-PCS | Mod: ,,, | Performed by: NURSE PRACTITIONER

## 2022-11-29 PROCEDURE — 1159F PR MEDICATION LIST DOCUMENTED IN MEDICAL RECORD: ICD-10-PCS | Mod: ,,, | Performed by: NURSE PRACTITIONER

## 2022-11-29 PROCEDURE — 3008F PR BODY MASS INDEX (BMI) DOCUMENTED: ICD-10-PCS | Mod: ,,, | Performed by: NURSE PRACTITIONER

## 2022-11-29 PROCEDURE — 4010F ACE/ARB THERAPY RXD/TAKEN: CPT | Mod: ,,, | Performed by: NURSE PRACTITIONER

## 2022-11-29 PROCEDURE — 99396 PREV VISIT EST AGE 40-64: CPT | Mod: ,,, | Performed by: NURSE PRACTITIONER

## 2022-11-29 PROCEDURE — 3008F BODY MASS INDEX DOCD: CPT | Mod: ,,, | Performed by: NURSE PRACTITIONER

## 2022-11-29 NOTE — PROGRESS NOTES
Subjective     Patient ID: Eligio Felix is a 62 y.o. male.    Chief Complaint: Healthy You    HPI Eligio Felix presents today for Healthy You  Lab results reviewed with patient    Review of Systems   Constitutional:  Negative for fatigue, fever and unexpected weight change.   HENT:  Negative for nasal congestion, postnasal drip and sore throat.    Eyes:  Negative for pain and redness.   Respiratory:  Negative for cough, shortness of breath and wheezing.    Cardiovascular:  Negative for chest pain and leg swelling.   Gastrointestinal:  Negative for abdominal pain, constipation, diarrhea and nausea.   Genitourinary:  Negative for dysuria and hematuria.   Musculoskeletal:  Negative for gait problem.   Integumentary:  Negative for color change and rash.   Neurological:  Negative for vertigo, syncope and headaches.     Tobacco Use: Low Risk     Smoking Tobacco Use: Never    Smokeless Tobacco Use: Never    Passive Exposure: Not on file     Review of patient's allergies indicates:   Allergen Reactions    Iodinated contrast media      Current Outpatient Medications   Medication Instructions    amLODIPine (NORVASC) 5 mg, Oral, Daily    aspirin (ECOTRIN) 81 mg, Oral, Daily    clopidogreL (PLAVIX) 75 mg tablet Take 1 tablet by mouth once daily    HYDROmorphone (DILAUDID) 2 mg, Oral, Every 4 hours PRN    lisinopriL (PRINIVIL,ZESTRIL) 40 mg, Oral, Daily    metoprolol succinate (TOPROL-XL) 100 mg, Oral, Daily    promethazine (PHENERGAN) 25 mg, Oral, Every 4 hours PRN    rosuvastatin (CRESTOR) 5 mg, Oral, Nightly     Medications Discontinued During This Encounter   Medication Reason    cefdinir (OMNICEF) 300 MG capsule     HYDROcodone-acetaminophen (NORCO) 5-325 mg per tablet        Past Medical History:   Diagnosis Date    Broken ankle     left    C. difficile colitis     Coronary artery disease     CTS (carpal tunnel syndrome)     Diabetes mellitus     Diverticulosis of sigmoid colon     Hydronephrosis,  "left     Hyperlipidemia     Hypertension     Kidney stones     Left ureteral stone 11/3/2022    Sleep apnea      Health Maintenance Topics with due status: Not Due       Topic Last Completion Date    Lipid Panel 11/28/2022    High Dose Statin 11/29/2022    Aspirin/Antiplatelet Therapy 11/29/2022     Immunization History   Administered Date(s) Administered    COVID-19, MRNA, LN-S, PF (MODERNA FULL 0.5 ML DOSE) 03/15/2021, 04/12/2021       Objective     Body mass index is 35.73 kg/m².  Wt Readings from Last 3 Encounters:   11/29/22 106.6 kg (235 lb)   11/21/22 106.1 kg (234 lb)   11/15/22 107 kg (236 lb)     Ht Readings from Last 3 Encounters:   11/29/22 5' 8" (1.727 m)   11/21/22 5' 8" (1.727 m)   11/15/22 5' 8" (1.727 m)     BP Readings from Last 3 Encounters:   11/29/22 134/72   11/21/22 (!) 150/88   11/15/22 138/88     Temp Readings from Last 3 Encounters:   11/29/22 98.3 °F (36.8 °C)   11/15/22 98.7 °F (37.1 °C)   11/03/22 97.8 °F (36.6 °C)     Pulse Readings from Last 3 Encounters:   11/29/22 78   11/21/22 79   11/15/22 88     Resp Readings from Last 3 Encounters:   11/29/22 16   11/15/22 17   11/02/22 17     PF Readings from Last 3 Encounters:   No data found for PF       Physical Exam  Constitutional:       General: He is not in acute distress.     Appearance: Normal appearance. He is not ill-appearing.   HENT:      Head: Normocephalic.   Eyes:      Conjunctiva/sclera: Conjunctivae normal.      Pupils: Pupils are equal, round, and reactive to light.   Cardiovascular:      Rate and Rhythm: Normal rate and regular rhythm.      Heart sounds: No murmur heard.  Pulmonary:      Effort: Pulmonary effort is normal. No respiratory distress.      Breath sounds: Normal breath sounds. No wheezing, rhonchi or rales.   Abdominal:      General: Bowel sounds are normal.      Palpations: Abdomen is soft.      Tenderness: There is no abdominal tenderness.   Musculoskeletal:         General: No swelling. Normal range of motion. "      Cervical back: Neck supple. No rigidity or tenderness.      Right lower leg: No edema.      Left lower leg: No edema.   Skin:     General: Skin is warm and dry.   Neurological:      General: No focal deficit present.      Mental Status: He is alert and oriented to person, place, and time.      Cranial Nerves: No cranial nerve deficit.       Assessment and Plan     Problem List Items Addressed This Visit    None  Visit Diagnoses       Routine general medical examination at a health care facility    -  Primary            Plan: Labs reviewed      I have reviewed the medications, allergies, and problem list.     Goal Actions:    What type of visit is the patient here for today?: Healthy You  Does the patient consent to enroll in [employer] Healthy?: Yes  Is this a Wellness Follow Up?: No  What is your overall wellness goal? (select at least one): Improve overall health, Lose weight, Lifestyle modifications  Choose 3: Exercise, Nutrition, Lifestyle, Biometric  Biometric Actions: Attend regularly scheduled office visits, SBP<120 and DBP<80, LDL Chol<100, BMI>30 lose 1-2 lbs per week  Lifestyle Actions : Develop good support system, Schedule colonoscopy, sigmoidoscopy, or Colorguard if applicable, Take medications as prescribed  Nurtrition Actions: Avoid adding table salt, Avoid carbonated beverages, Read nutrition labels, drink 8-10 glasses of water per day, Recommend weight loss, adhere to a specific caloric diet, Eat a well-balanced diet, Eat heart healthy diet, DASH diet, Decrease intake of fast food  Exercise Actions: Recommend physical activity 30 minutes per day 3-5 times/week

## 2022-11-29 NOTE — PATIENT INSTRUCTIONS
"Patient Education       Diet and Health   The Basics   Written by the doctors and editors at [facility]   Why is it important to eat a healthy diet? -- It's important to eat a healthy diet because eating the right foods can keep you healthy now and later on in life.  Which foods are especially healthy? -- Foods that are especially healthy include:  Fruits and vegetables - Eating a diet with lots of fruits and vegetables can help prevent heart disease and strokes. It might also help prevent certain types of cancers. Try to eat fruits and vegetables at each meal and also for snacks. If you don't have fresh fruits and vegetables available, you can eat frozen or canned ones instead. Doctors recommend eating at least 2 1/2 servings of vegetables and 2 servings of fruits each day.  Foods with fiber - Eating foods with a lot of fiber can help prevent heart disease and strokes. If you have type 2 diabetes, it can also help control your blood sugar. Foods that have a lot of fiber include vegetables, fruits, beans, nuts, oatmeal, and whole grain breads and cereals. You can tell how much fiber is in a food by reading the nutrition label (figure 1). Doctors recommend eating 25 to 36 grams of fiber each day.  Foods with folate (also called folic acid) - Folate is a vitamin that is important for pregnant people, since it helps prevent certain birth defects. Anyone who could get pregnant should get at least 400 micrograms of folic acid daily, whether or not they are actively trying to get pregnant. Folate is found in many breakfast cereals, oranges, orange juice, and green leafy vegetables.  Foods with calcium and vitamin D - Babies, children, and adults need calcium and vitamin D to help keep their bones strong. Adults also need calcium and vitamin D to help prevent osteoporosis. Osteoporosis is a condition that causes bones to get "thin" and break more easily than usual. Different foods and drinks have calcium and vitamin D in " "them (figure 2). People who don't get enough calcium and vitamin D in their diet might need to take a supplement.  Foods with protein - Protein helps your muscles stay strong. Healthy foods with a lot of protein include chicken, fish, eggs, beans, nuts, and soy products. Red meat also has a lot of protein, but it also contains fats, which can be unhealthy.  Some experts recommend a "Mediterranean diet." This involves eating a lot of fruits, vegetables, nuts, whole grains, and olive oil. It also includes some fish, poultry, and dairy products, but not a lot of red meat. Eating this way can help your overall health, and might even lower your risk of having a stroke.  What foods should I avoid or limit? -- To eat a healthy diet, there are some things you should avoid or limit. They include:   Fats - There are different types of fats. Some types of fats are better for your body than others.  Trans fats are especially unhealthy. They are found in margarines, many fast foods, and some store-bought baked goods. Trans fats can raise your cholesterol level and your increase your chance of getting heart disease. You should avoid eating foods with these types of fats.  The type of "polyunsaturated" fats found in fish seems to be healthy and can reduce your chance of getting heart disease. Other polyunsaturated fats might also be good for your health. When you cook, it's best to use oils with healthier fats, such as olive oil and canola oil.  Sugar - To have a healthy diet, it's important to limit or avoid sugar, sweets, and refined grains. Refined grains are found in white bread, white rice, most forms of pasta, and most packaged "snack" foods. Whole grains, such as whole-wheat bread and brown rice, have more fiber and are better for your health.  Avoiding sugar-sweetened beverages, like soda and sports drinks, can also help improve your health.  Red meat - Studies have shown that eating a lot of red meat can increase your " "risk of certain health problems, including heart disease and cancer. You should limit the amount of red meat that you eat.  Can I drink alcohol as part of a healthy diet? -- People who drink a small amount of alcohol each day might have a lower chance of getting heart disease. But drinking alcohol can lead to problems. For example, it can raise a person's chances of getting liver disease and certain types of cancers. In women, even 1 drink a day can increase the risk of getting breast cancer.  Most doctors recommend that adult women not have more than 1 drink a day and that adult men not have more than 2 drinks a day. The limits are different because most women's bodies take longer to break down alcohol.  How many calories do I need each day? -- The number of calories you need each day depends on your weight, height, age, sex, and how active you are.  Your doctor or nurse can tell you how many calories you should eat each day. If you are trying to lose weight, you should eat fewer calories each day.  What if I have questions? -- If you have questions about which foods you should or should not eat, ask your doctor or nurse. The right diet for you will depend, in part, on your health and any medical conditions you have.  All topics are updated as new evidence becomes available and our peer review process is complete.  This topic retrieved from [employer] on: Sep 21, 2021.  Topic 70928 Version 20.0  Release: 29.4.2 - C29.263  © 2021 UpToDate, Inc. and/or its affiliates. All rights reserved.  figure 1: Nutrition label - fiber     This is an example of a nutrition label. To figure out how much fiber is in a food, look for the line that says "Dietary Fiber." It's also important to look at the serving size. This food has 7 grams of fiber in each serving, and each serving is 1 cup.  Graphic 45083 Version 7.0    figure 2: Foods and drinks with calcium and vitamin D     Foods rich in calcium include ice cream, soy milk, breads, " "kale, broccoli, milk, cheese, cottage cheese, almonds, yogurt, ready-to-eat cereals, beans, and tofu. Foods rich in vitamin D include milk, fortified plant-based "milks" (soy, almond), canned tuna fish, cod liver oil, yogurt, ready-to-eat-cereals, cooked salmon, canned sardines, mackerel, and eggs. Some of these foods are rich in both.  Graphic 11887 Version 4.0    Consumer Information Use and Disclaimer   This information is not specific medical advice and does not replace information you receive from your health care provider. This is only a brief summary of general information. It does NOT include all information about conditions, illnesses, injuries, tests, procedures, treatments, therapies, discharge instructions or life-style choices that may apply to you. You must talk with your health care provider for complete information about your health and treatment options. This information should not be used to decide whether or not to accept your health care provider's advice, instructions or recommendations. Only your health care provider has the knowledge and training to provide advice that is right for you. The use of this information is governed by the [employer] End User License Agreement, available at [email or web address].The use of [employer] content is governed by the [employer] Terms of Use. ©2021 UpToDate, Inc. All rights reserved.  Copyright   © 2021 UpToDate, Inc. and/or its affiliates. All rights reserved.    "

## 2022-12-22 ENCOUNTER — HOSPITAL ENCOUNTER (OUTPATIENT)
Dept: RADIOLOGY | Facility: HOSPITAL | Age: 62
Discharge: HOME | End: 2022-12-22
Attending: UROLOGY
Payer: COMMERCIAL

## 2022-12-22 ENCOUNTER — OFFICE VISIT (OUTPATIENT)
Dept: UROLOGY | Facility: CLINIC | Age: 62
End: 2022-12-22
Payer: COMMERCIAL

## 2022-12-22 VITALS
HEART RATE: 80 BPM | WEIGHT: 237 LBS | DIASTOLIC BLOOD PRESSURE: 87 MMHG | HEIGHT: 68 IN | BODY MASS INDEX: 35.92 KG/M2 | SYSTOLIC BLOOD PRESSURE: 132 MMHG

## 2022-12-22 DIAGNOSIS — N20.0 KIDNEY STONES: ICD-10-CM

## 2022-12-22 DIAGNOSIS — N23 RENAL COLIC: ICD-10-CM

## 2022-12-22 DIAGNOSIS — N20.0 LEFT RENAL STONE: Primary | ICD-10-CM

## 2022-12-22 DIAGNOSIS — N13.2 HYDRONEPHROSIS WITH URINARY OBSTRUCTION DUE TO RENAL CALCULUS: ICD-10-CM

## 2022-12-22 PROCEDURE — 74018 XR KUB: ICD-10-PCS | Mod: 26,,, | Performed by: RADIOLOGY

## 2022-12-22 PROCEDURE — 4010F PR ACE/ARB THEARPY RXD/TAKEN: ICD-10-PCS | Mod: ,,, | Performed by: UROLOGY

## 2022-12-22 PROCEDURE — 3075F SYST BP GE 130 - 139MM HG: CPT | Mod: ,,, | Performed by: UROLOGY

## 2022-12-22 PROCEDURE — 3008F PR BODY MASS INDEX (BMI) DOCUMENTED: ICD-10-PCS | Mod: ,,, | Performed by: UROLOGY

## 2022-12-22 PROCEDURE — 99214 OFFICE O/P EST MOD 30 MIN: CPT | Mod: PBBFAC | Performed by: UROLOGY

## 2022-12-22 PROCEDURE — 99213 OFFICE O/P EST LOW 20 MIN: CPT | Mod: S$PBB,,, | Performed by: UROLOGY

## 2022-12-22 PROCEDURE — 4010F ACE/ARB THERAPY RXD/TAKEN: CPT | Mod: ,,, | Performed by: UROLOGY

## 2022-12-22 PROCEDURE — 1159F PR MEDICATION LIST DOCUMENTED IN MEDICAL RECORD: ICD-10-PCS | Mod: ,,, | Performed by: UROLOGY

## 2022-12-22 PROCEDURE — 3044F HG A1C LEVEL LT 7.0%: CPT | Mod: ,,, | Performed by: UROLOGY

## 2022-12-22 PROCEDURE — 99213 PR OFFICE/OUTPT VISIT, EST, LEVL III, 20-29 MIN: ICD-10-PCS | Mod: S$PBB,,, | Performed by: UROLOGY

## 2022-12-22 PROCEDURE — 1159F MED LIST DOCD IN RCRD: CPT | Mod: ,,, | Performed by: UROLOGY

## 2022-12-22 PROCEDURE — 3075F PR MOST RECENT SYSTOLIC BLOOD PRESS GE 130-139MM HG: ICD-10-PCS | Mod: ,,, | Performed by: UROLOGY

## 2022-12-22 PROCEDURE — 1160F PR REVIEW ALL MEDS BY PRESCRIBER/CLIN PHARMACIST DOCUMENTED: ICD-10-PCS | Mod: ,,, | Performed by: UROLOGY

## 2022-12-22 PROCEDURE — 74018 RADEX ABDOMEN 1 VIEW: CPT | Mod: TC

## 2022-12-22 PROCEDURE — 3079F DIAST BP 80-89 MM HG: CPT | Mod: ,,, | Performed by: UROLOGY

## 2022-12-22 PROCEDURE — 74018 RADEX ABDOMEN 1 VIEW: CPT | Mod: 26,,, | Performed by: RADIOLOGY

## 2022-12-22 PROCEDURE — 3008F BODY MASS INDEX DOCD: CPT | Mod: ,,, | Performed by: UROLOGY

## 2022-12-22 PROCEDURE — 3079F PR MOST RECENT DIASTOLIC BLOOD PRESSURE 80-89 MM HG: ICD-10-PCS | Mod: ,,, | Performed by: UROLOGY

## 2022-12-22 PROCEDURE — 3044F PR MOST RECENT HEMOGLOBIN A1C LEVEL <7.0%: ICD-10-PCS | Mod: ,,, | Performed by: UROLOGY

## 2022-12-22 PROCEDURE — 1160F RVW MEDS BY RX/DR IN RCRD: CPT | Mod: ,,, | Performed by: UROLOGY

## 2022-12-23 NOTE — PATIENT INSTRUCTIONS
Patient has not used any of his pain medication as he has not had any pain since he was here.  He is not straining his urine regularly but has seen nothing that suggests a stone that has passed.  Can not see stone on KUB.  Patient to return on around the 1st of February.  We will see him in early February with another renal colic CT and make decision about definitive treatment if stone is still present.  He is on anticoagulation with Plavix.

## 2022-12-23 NOTE — PROGRESS NOTES
"Subjective:       Patient ID: Eligio Felix is a 62 y.o. male.    Chief Complaint: Follow-up (One month follow up, KUB)       Mr. Felix is a very pleasant 63 yo gentleman who was previously seen by Dr. Mathis for stone history dating back 25 years.  He states history of 3 past shockwave lithotripsies in past years.  He passed his last stone without intervention.  He reports onset of left intermittent flank pain with nausea 6 days ago while he was working in [place].  He has been controlling this pain with NSAIDS.  Patient takes plavix daily, followed by [facility]rey for history of stents 2014.  UTD on COVID vaccines;  denies problems with prior surgeries.  Pain rated as "6/10" presently.    Lab Results       Component                Value               Date                       CREATININE               1.28                02/07/2022            Dr. Villeda to room to review imaging and evaluate patient.  [1/3/2022]-----------------------------------------------------------------------  Review of Systems   Constitutional: Negative.    HENT: Negative.     Eyes: Negative.    Respiratory: Negative.     Cardiovascular: Negative.    Gastrointestinal: Negative.    Endocrine: Negative.    Genitourinary:  Positive for dysuria, flank pain, frequency, hematuria and urgency. Negative for decreased urine volume, difficulty urinating, enuresis, genital sores, penile discharge, penile pain, penile swelling, scrotal swelling and testicular pain.        Intermittent, irritative voiding complaints.   Allergic/Immunologic: Negative.    Neurological: Negative.    Hematological: Negative.    Psychiatric/Behavioral: Negative.          Objective:  Physical Exam  Vitals and nursing note reviewed.   Constitutional:       General: He is not in acute distress.     Appearance: Normal appearance. He is not ill-appearing, toxic-appearing or diaphoretic.   HENT:      Head: Normocephalic.      Nose: Nose normal.      Mouth/Throat:    "   Mouth: Mucous membranes are moist.      Pharynx: Oropharynx is clear.   Eyes:      General: No scleral icterus.     Conjunctiva/sclera: Conjunctivae normal.      Pupils: Pupils are equal, round, and reactive to light.   Cardiovascular:      Rate and Rhythm: Normal rate and regular rhythm.      Pulses: Normal pulses.      Heart sounds: Normal heart sounds. No murmur heard.  Pulmonary:      Effort: Pulmonary effort is normal.      Breath sounds: Normal breath sounds.   Abdominal:      General: Bowel sounds are normal. There is no distension.      Palpations: Abdomen is soft. There is no mass.      Tenderness: There is no abdominal tenderness. There is no right CVA tenderness, left CVA tenderness or guarding.   Musculoskeletal:         General: Normal range of motion.      Cervical back: Normal range of motion and neck supple.      Right lower leg: No edema.      Left lower leg: No edema.   Skin:     General: Skin is warm and dry.      Capillary Refill: Capillary refill takes less than 2 seconds.      Coloration: Skin is not jaundiced or pale.      Findings: No bruising, erythema, lesion or rash.   Neurological:      General: No focal deficit present.      Mental Status: He is alert and oriented to person, place, and time.   Psychiatric:         Mood and Affect: Mood normal.         Behavior: Behavior normal.         Thought Content: Thought content normal.         Judgment: Judgment normal.          Assessment:         1.         Left ureteral stone   2.         Hydronephrosis of left kidney   3.         Urinary tract obstruction due to kidney stone   4.         Kidney stones   5.         Hydronephrosis, left   6.         Hematuria, unspecified type   7.         Preop examination   8.         Left lower quadrant abdominal pain         Plan:      Left ureteral stone  ·           toradol 60 mg IM  ·           CT Stone protocol 0800 tomorrow morning, will see patient after.  ·           He will likely have a stent  placed since he will need to bee off of Plavix for one week.  ·           Stone appears to be passable stone.  If not passed, will consider intervention.  ·           Dr. Villeda agrees with POC     Hydronephrosis, left  ·           See ureteral stone POC           Electronically signed by SHEREEN Chan at 11/3/2022  4:16 PM  ---------------------------------------------------------------------------------------------------------------------------------------------------------------------------------------------------------------------------------------------------------------------------------  The above note is note of Ms. Mora.  Patient was seen with her yesterday.  Returned today for follow-up CT scan and decision about what we need to do with stone at the left ureteropelvic junction.  He is not having any pain currently and  feels normal.  He does not have to go back to work in [place] until after Thanksgiving.  Patient in with his wife.  He seems to be feeling fine today.  CT reviewed with patient and his wife and the stone that is about a 5 mm stone is present at the left ureteropelvic junction.  There is a dilated left renal pelvis that is presumably chronic although may be worse from the acute stone.  There is fairly good parenchyma of the left kidney that looks essentially the same as the parenchyma of right kidney.  [November 4, 2022]      Mr. Felix is in for follow-up of the left ureteral stone that was found on previous CT.  He has required pain medicine only 1 time in the last 2 weeks.  Has not had any pain in last several days.  He has had some low back pain on left side off and on but severe pain only once.  He has not been aware of stone passing.  Feeling fine today.  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 [  November 21, 2022]    Mr. Felix has had no pain since he was here 1 month ago.  Totally asymptomatic in regards to his stone.  If he has passed it  he is unaware of passing it.  He just came back into town today and will have to go back to [place] to complete his job in a few weeks.  He will be through with current job at the end of January.  No voiding problems and nothing suggesting stone difficulty.  We brought him in for KUB to see if we could see a stone.  Stone not seen well on previous KUB. [December 22, 2022]  Review of Systems      Objective:      Physical Exam  Constitutional:       Appearance: Normal appearance. He is normal weight.   Abdominal:      Tenderness: There is no right CVA tenderness or left CVA tenderness.   Neurological:      Mental Status: He is alert.   Psychiatric:         Mood and Affect: Mood normal.         Behavior: Behavior normal.     Urinalysis reveals no obvious red blood cells.  Occasional pus noted.  Dipstick negative with pH 5.0 and urine specific gravity 1.025  Assessment:       Problem List Items Addressed This Visit    None  Visit Diagnoses       Left renal stone    -  Primary    Hydronephrosis with urinary obstruction due to renal calculus        Renal colic                Plan:         Patient has not used any of his pain medication as he has not had any pain since he was here.  He is not straining his urine regularly but has seen nothing that suggests a stone that has passed.  Can not see stone on KUB.  Patient to return on around the 1st of February.  We will see him in early February with another renal colic CT and make decision about definitive treatment if stone is still present.  He is on anticoagulation with Plavix.

## 2023-01-04 DIAGNOSIS — N20.0 KIDNEY STONE: Primary | ICD-10-CM

## 2023-01-11 DIAGNOSIS — I10 HYPERTENSION, UNSPECIFIED TYPE: ICD-10-CM

## 2023-01-11 RX ORDER — LISINOPRIL 40 MG/1
40 TABLET ORAL DAILY
Qty: 90 TABLET | Refills: 1 | Status: SHIPPED | OUTPATIENT
Start: 2023-01-11 | End: 2023-02-14 | Stop reason: SDUPTHER

## 2023-01-26 DIAGNOSIS — I25.10 CORONARY ARTERY DISEASE: ICD-10-CM

## 2023-01-26 DIAGNOSIS — I10 HYPERTENSION, UNSPECIFIED TYPE: ICD-10-CM

## 2023-01-27 RX ORDER — METOPROLOL SUCCINATE 100 MG/1
100 TABLET, EXTENDED RELEASE ORAL DAILY
Qty: 30 TABLET | Refills: 0 | Status: SHIPPED | OUTPATIENT
Start: 2023-01-27 | End: 2023-02-14 | Stop reason: SDUPTHER

## 2023-01-27 RX ORDER — CLOPIDOGREL BISULFATE 75 MG/1
75 TABLET ORAL DAILY
Qty: 30 TABLET | Refills: 0 | Status: SHIPPED | OUTPATIENT
Start: 2023-01-27 | End: 2023-02-14 | Stop reason: SDUPTHER

## 2023-02-07 ENCOUNTER — OFFICE VISIT (OUTPATIENT)
Dept: UROLOGY | Facility: CLINIC | Age: 63
End: 2023-02-07
Payer: COMMERCIAL

## 2023-02-07 ENCOUNTER — HOSPITAL ENCOUNTER (OUTPATIENT)
Dept: RADIOLOGY | Facility: HOSPITAL | Age: 63
Discharge: HOME | End: 2023-02-07
Attending: UROLOGY
Payer: COMMERCIAL

## 2023-02-07 VITALS
HEIGHT: 68 IN | BODY MASS INDEX: 35.77 KG/M2 | SYSTOLIC BLOOD PRESSURE: 137 MMHG | HEART RATE: 79 BPM | WEIGHT: 236 LBS | DIASTOLIC BLOOD PRESSURE: 88 MMHG

## 2023-02-07 DIAGNOSIS — N20.0 KIDNEY STONE: ICD-10-CM

## 2023-02-07 DIAGNOSIS — N20.1 LEFT URETERAL STONE: ICD-10-CM

## 2023-02-07 DIAGNOSIS — Z87.448 HISTORY OF HYDRONEPHROSIS: ICD-10-CM

## 2023-02-07 DIAGNOSIS — N20.0 KIDNEY STONE: Primary | ICD-10-CM

## 2023-02-07 PROCEDURE — 4010F ACE/ARB THERAPY RXD/TAKEN: CPT | Mod: ,,, | Performed by: UROLOGY

## 2023-02-07 PROCEDURE — 1159F PR MEDICATION LIST DOCUMENTED IN MEDICAL RECORD: ICD-10-PCS | Mod: ,,, | Performed by: UROLOGY

## 2023-02-07 PROCEDURE — 1159F MED LIST DOCD IN RCRD: CPT | Mod: ,,, | Performed by: UROLOGY

## 2023-02-07 PROCEDURE — 99213 OFFICE O/P EST LOW 20 MIN: CPT | Mod: S$PBB,,, | Performed by: UROLOGY

## 2023-02-07 PROCEDURE — 3079F DIAST BP 80-89 MM HG: CPT | Mod: ,,, | Performed by: UROLOGY

## 2023-02-07 PROCEDURE — 3079F PR MOST RECENT DIASTOLIC BLOOD PRESSURE 80-89 MM HG: ICD-10-PCS | Mod: ,,, | Performed by: UROLOGY

## 2023-02-07 PROCEDURE — 99214 OFFICE O/P EST MOD 30 MIN: CPT | Mod: PBBFAC,25 | Performed by: UROLOGY

## 2023-02-07 PROCEDURE — 3075F PR MOST RECENT SYSTOLIC BLOOD PRESS GE 130-139MM HG: ICD-10-PCS | Mod: ,,, | Performed by: UROLOGY

## 2023-02-07 PROCEDURE — 3075F SYST BP GE 130 - 139MM HG: CPT | Mod: ,,, | Performed by: UROLOGY

## 2023-02-07 PROCEDURE — 74176 CT ABD & PELVIS W/O CONTRAST: CPT | Mod: TC

## 2023-02-07 PROCEDURE — 4010F PR ACE/ARB THEARPY RXD/TAKEN: ICD-10-PCS | Mod: ,,, | Performed by: UROLOGY

## 2023-02-07 PROCEDURE — 3008F BODY MASS INDEX DOCD: CPT | Mod: ,,, | Performed by: UROLOGY

## 2023-02-07 PROCEDURE — 74176 CT RENAL STONE STUDY ABD PELVIS WO: ICD-10-PCS | Mod: 26,,, | Performed by: RADIOLOGY

## 2023-02-07 PROCEDURE — 99213 PR OFFICE/OUTPT VISIT, EST, LEVL III, 20-29 MIN: ICD-10-PCS | Mod: S$PBB,,, | Performed by: UROLOGY

## 2023-02-07 PROCEDURE — 3008F PR BODY MASS INDEX (BMI) DOCUMENTED: ICD-10-PCS | Mod: ,,, | Performed by: UROLOGY

## 2023-02-07 PROCEDURE — 74176 CT ABD & PELVIS W/O CONTRAST: CPT | Mod: 26,,, | Performed by: RADIOLOGY

## 2023-02-07 NOTE — PROGRESS NOTES
"Subjective:       Patient ID: Eligio Felix is a 62 y.o. male.    Chief Complaint: Follow-up (Renal colic CT)       Mr. Felix is a very pleasant 61 yo gentleman who was previously seen by Dr. Mathis for stone history dating back 25 years.  He states history of 3 past shockwave lithotripsies in past years.  He passed his last stone without intervention.  He reports onset of left intermittent flank pain with nausea 6 days ago while he was working in [place].  He has been controlling this pain with NSAIDS.  Patient takes plavix daily, followed by [facility] for history of stents 2014.  UTD on COVID vaccines;  denies problems with prior surgeries.  Pain rated as "6/10" presently.    Lab Results       Component                Value               Date                       CREATININE               1.28                02/07/2022            Dr. Villeda to room to review imaging and evaluate patient.  [1/3/2022]-----------------------------------------------------------------------  Review of Systems   Constitutional: Negative.    HENT: Negative.     Eyes: Negative.    Respiratory: Negative.     Cardiovascular: Negative.    Gastrointestinal: Negative.    Endocrine: Negative.    Genitourinary:  Positive for dysuria, flank pain, frequency, hematuria and urgency. Negative for decreased urine volume, difficulty urinating, enuresis, genital sores, penile discharge, penile pain, penile swelling, scrotal swelling and testicular pain.        Intermittent, irritative voiding complaints.   Allergic/Immunologic: Negative.    Neurological: Negative.    Hematological: Negative.    Psychiatric/Behavioral: Negative.          Objective:  Physical Exam  Vitals and nursing note reviewed.   Constitutional:       General: He is not in acute distress.     Appearance: Normal appearance. He is not ill-appearing, toxic-appearing or diaphoretic.   HENT:      Head: Normocephalic.      Nose: Nose normal.      Mouth/Throat:      Mouth: " Mucous membranes are moist.      Pharynx: Oropharynx is clear.   Eyes:      General: No scleral icterus.     Conjunctiva/sclera: Conjunctivae normal.      Pupils: Pupils are equal, round, and reactive to light.   Cardiovascular:      Rate and Rhythm: Normal rate and regular rhythm.      Pulses: Normal pulses.      Heart sounds: Normal heart sounds. No murmur heard.  Pulmonary:      Effort: Pulmonary effort is normal.      Breath sounds: Normal breath sounds.   Abdominal:      General: Bowel sounds are normal. There is no distension.      Palpations: Abdomen is soft. There is no mass.      Tenderness: There is no abdominal tenderness. There is no right CVA tenderness, left CVA tenderness or guarding.   Musculoskeletal:         General: Normal range of motion.      Cervical back: Normal range of motion and neck supple.      Right lower leg: No edema.      Left lower leg: No edema.   Skin:     General: Skin is warm and dry.      Capillary Refill: Capillary refill takes less than 2 seconds.      Coloration: Skin is not jaundiced or pale.      Findings: No bruising, erythema, lesion or rash.   Neurological:      General: No focal deficit present.      Mental Status: He is alert and oriented to person, place, and time.   Psychiatric:         Mood and Affect: Mood normal.         Behavior: Behavior normal.         Thought Content: Thought content normal.         Judgment: Judgment normal.          Assessment:         1.         Left ureteral stone   2.         Hydronephrosis of left kidney   3.         Urinary tract obstruction due to kidney stone   4.         Kidney stones   5.         Hydronephrosis, left   6.         Hematuria, unspecified type   7.         Preop examination   8.         Left lower quadrant abdominal pain         Plan:      Left ureteral stone  ·           toradol 60 mg IM  ·           CT Stone protocol 0800 tomorrow morning, will see patient after.  ·           He will likely have a stent placed since  he will need to bee off of Plavix for one week.  ·           Stone appears to be passable stone.  If not passed, will consider intervention.  ·           Dr. Villeda agrees with POC     Hydronephrosis, left  ·           See ureteral stone POC           Electronically signed by SHEREEN Chan at 11/3/2022  4:16 PM  ---------------------------------------------------------------------------------------------------------------------------------------------------------------------------------------------------------------------------------------------------------------------------------  The above note is note of Ms. Mora.  Patient was seen with her yesterday.  Returned today for follow-up CT scan and decision about what we need to do with stone at the left ureteropelvic junction.  He is not having any pain currently and  feels normal.  He does not have to go back to work in [place] until after Thanksgiving.  Patient in with his wife.  He seems to be feeling fine today.  CT reviewed with patient and his wife and the stone that is about a 5 mm stone is present at the left ureteropelvic junction.  There is a dilated left renal pelvis that is presumably chronic although may be worse from the acute stone.  There is fairly good parenchyma of the left kidney that looks essentially the same as the parenchyma of right kidney.  [November 4, 2022]      Mr. Felix is in for follow-up of the left ureteral stone that was found on previous CT.  He has required pain medicine only 1 time in the last 2 weeks.  Has not had any pain in last several days.  He has had some low back pain on left side off and on but severe pain only once.  He has not been aware of stone passing.  Feeling fine today.  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 [  November 21, 2022]     Mr. Felix has had no pain since he was here 1 month ago.  Totally asymptomatic in regards to his stone.  If he has passed it he is  unaware of passing it.  He just came back into town today and will have to go back to [place] to complete his job in a few weeks.  He will be through with current job at the end of January.  No voiding problems and nothing suggesting stone difficulty.  We brought him in for KUB to see if we could see a stone.  Stone not seen well on previous KUB. [December 22, 2022]    Patient is still totally asymptomatic.  He has been unaware of any colicky pain or passage of stone.  We brought him back for CT scan as we could not see any stone on KUB on last trip.  He remains asymptomatic.  No visible blood in urine etc.. [February 7, 2023]    Review of Systems      Objective:      Physical Exam  Constitutional:       Appearance: Normal appearance. He is normal weight.   Abdominal:      Tenderness: There is no right CVA tenderness or left CVA tenderness.   Neurological:      Mental Status: He is alert.   Psychiatric:         Mood and Affect: Mood normal.         Behavior: Behavior normal.     Urinalysis revealed no red blood cells and only occasional pus.  Dipstick negative with pH 5.0 and specific gravity 1.025  Assessment:       Problem List Items Addressed This Visit          Renal/    Left ureteral stone     Other Visit Diagnoses       Kidney stone    -  Primary    Relevant Orders    X-Ray KUB    History of hydronephrosis                Plan:         CT reviewed with patient.  No stone seen.  No residual hydronephrosis.  Obviously he passed the stone when he was totally unaware.  I will see again on p.r.n. basis for problems.  Otherwise 1 year appointment with KUB.

## 2023-02-08 NOTE — PATIENT INSTRUCTIONS
CT reviewed with patient.  No stone seen.  No residual hydronephrosis.  Obviously he passed the stone when he was totally unaware.  I will see again on p.r.n. basis for problems.  Otherwise 1 year appointment with [facility].

## 2023-02-14 ENCOUNTER — OFFICE VISIT (OUTPATIENT)
Dept: CARDIOLOGY | Facility: CLINIC | Age: 63
End: 2023-02-14
Payer: COMMERCIAL

## 2023-02-14 VITALS
SYSTOLIC BLOOD PRESSURE: 128 MMHG | HEART RATE: 94 BPM | DIASTOLIC BLOOD PRESSURE: 82 MMHG | WEIGHT: 237.19 LBS | OXYGEN SATURATION: 97 % | HEIGHT: 68 IN | BODY MASS INDEX: 35.95 KG/M2

## 2023-02-14 DIAGNOSIS — Z79.899 HIGH RISK MEDICATION USE: ICD-10-CM

## 2023-02-14 DIAGNOSIS — E78.5 HYPERLIPIDEMIA, UNSPECIFIED HYPERLIPIDEMIA TYPE: ICD-10-CM

## 2023-02-14 DIAGNOSIS — I25.10 CORONARY ARTERY DISEASE: ICD-10-CM

## 2023-02-14 DIAGNOSIS — I10 HYPERTENSION, UNSPECIFIED TYPE: ICD-10-CM

## 2023-02-14 DIAGNOSIS — I25.10 CORONARY ARTERY DISEASE, UNSPECIFIED VESSEL OR LESION TYPE, UNSPECIFIED WHETHER ANGINA PRESENT, UNSPECIFIED WHETHER NATIVE OR TRANSPLANTED HEART: Primary | ICD-10-CM

## 2023-02-14 PROCEDURE — 4010F ACE/ARB THERAPY RXD/TAKEN: CPT | Mod: ,,, | Performed by: NURSE PRACTITIONER

## 2023-02-14 PROCEDURE — 3008F PR BODY MASS INDEX (BMI) DOCUMENTED: ICD-10-PCS | Mod: ,,, | Performed by: NURSE PRACTITIONER

## 2023-02-14 PROCEDURE — 3079F PR MOST RECENT DIASTOLIC BLOOD PRESSURE 80-89 MM HG: ICD-10-PCS | Mod: ,,, | Performed by: NURSE PRACTITIONER

## 2023-02-14 PROCEDURE — 3079F DIAST BP 80-89 MM HG: CPT | Mod: ,,, | Performed by: NURSE PRACTITIONER

## 2023-02-14 PROCEDURE — 1159F MED LIST DOCD IN RCRD: CPT | Mod: ,,, | Performed by: NURSE PRACTITIONER

## 2023-02-14 PROCEDURE — 3074F PR MOST RECENT SYSTOLIC BLOOD PRESSURE < 130 MM HG: ICD-10-PCS | Mod: ,,, | Performed by: NURSE PRACTITIONER

## 2023-02-14 PROCEDURE — 99214 PR OFFICE/OUTPT VISIT, EST, LEVL IV, 30-39 MIN: ICD-10-PCS | Mod: S$PBB,,, | Performed by: NURSE PRACTITIONER

## 2023-02-14 PROCEDURE — 3074F SYST BP LT 130 MM HG: CPT | Mod: ,,, | Performed by: NURSE PRACTITIONER

## 2023-02-14 PROCEDURE — 1159F PR MEDICATION LIST DOCUMENTED IN MEDICAL RECORD: ICD-10-PCS | Mod: ,,, | Performed by: NURSE PRACTITIONER

## 2023-02-14 PROCEDURE — 93010 ELECTROCARDIOGRAM REPORT: CPT | Mod: S$PBB,,, | Performed by: INTERNAL MEDICINE

## 2023-02-14 PROCEDURE — 93010 EKG 12-LEAD: ICD-10-PCS | Mod: S$PBB,,, | Performed by: INTERNAL MEDICINE

## 2023-02-14 PROCEDURE — 99214 OFFICE O/P EST MOD 30 MIN: CPT | Mod: S$PBB,,, | Performed by: NURSE PRACTITIONER

## 2023-02-14 PROCEDURE — 93005 ELECTROCARDIOGRAM TRACING: CPT | Mod: PBBFAC | Performed by: INTERNAL MEDICINE

## 2023-02-14 PROCEDURE — 4010F PR ACE/ARB THEARPY RXD/TAKEN: ICD-10-PCS | Mod: ,,, | Performed by: NURSE PRACTITIONER

## 2023-02-14 PROCEDURE — 3008F BODY MASS INDEX DOCD: CPT | Mod: ,,, | Performed by: NURSE PRACTITIONER

## 2023-02-14 PROCEDURE — 99213 OFFICE O/P EST LOW 20 MIN: CPT | Mod: PBBFAC | Performed by: NURSE PRACTITIONER

## 2023-02-14 RX ORDER — ROSUVASTATIN CALCIUM 10 MG/1
10 TABLET, COATED ORAL DAILY
Qty: 90 TABLET | Refills: 3 | Status: SHIPPED | OUTPATIENT
Start: 2023-02-14 | End: 2024-02-14

## 2023-02-17 RX ORDER — CLOPIDOGREL BISULFATE 75 MG/1
75 TABLET ORAL DAILY
Qty: 90 TABLET | Refills: 3 | Status: SHIPPED | OUTPATIENT
Start: 2023-02-17 | End: 2024-03-12 | Stop reason: SDUPTHER

## 2023-02-17 RX ORDER — METOPROLOL SUCCINATE 100 MG/1
100 TABLET, EXTENDED RELEASE ORAL DAILY
Qty: 90 TABLET | Refills: 3 | Status: SHIPPED | OUTPATIENT
Start: 2023-02-17 | End: 2024-03-12 | Stop reason: SDUPTHER

## 2023-02-17 RX ORDER — AMLODIPINE BESYLATE 5 MG/1
5 TABLET ORAL DAILY
Qty: 90 TABLET | Refills: 3 | Status: SHIPPED | OUTPATIENT
Start: 2023-02-17 | End: 2024-03-12 | Stop reason: SDUPTHER

## 2023-02-17 RX ORDER — LISINOPRIL 40 MG/1
40 TABLET ORAL DAILY
Qty: 90 TABLET | Refills: 3 | Status: SHIPPED | OUTPATIENT
Start: 2023-02-17 | End: 2024-03-12 | Stop reason: SDUPTHER

## 2023-02-17 NOTE — PROGRESS NOTES
[facility] note        DATE OF SERVICE: 02/17/2023       PCP: INDER Jones      CHIEF COMPLAINT:   Chief Complaint   Patient presents with    Edema     Goes down at night        HISTORY OF PRESENT ILLNESS:  Eligio Felix is a 62 y.o. male with a PMH of   Past Medical History:   Diagnosis Date    Broken ankle     left    C. difficile colitis     Coronary artery disease     CTS (carpal tunnel syndrome)     Diabetes mellitus     Diverticulosis of sigmoid colon     Hydronephrosis, left     Hyperlipidemia     Hypertension     Kidney stones     Left ureteral stone 11/3/2022    Sleep apnea      who presents for routine follow up. He having issues with edema to his left ankle but he had a serious injury to it approx 4 months ago requiring extensive surgical repair. The edema improves at night. Other wise he denies complaints.     2/15/2022 routine follow up. He states after the last OV his bp got better but in the last few weeks has gotten bad again. He is wearing his cpap every night. He has arthritis in both shoulders with pain at night requiring meds. He was taking Advil but was changed to Mobic. He has also gained 4 lbs and been off of his usual diet.   Chief Complaint   Patient presents with    Edema     Goes down at night            PAST MEDICAL HISTORY:  Past Medical History:   Diagnosis Date    Broken ankle     left    C. difficile colitis     Coronary artery disease     CTS (carpal tunnel syndrome)     Diabetes mellitus     Diverticulosis of sigmoid colon     Hydronephrosis, left     Hyperlipidemia     Hypertension     Kidney stones     Left ureteral stone 11/3/2022    Sleep apnea        PAST SURGICAL HISTORY:  Past Surgical History:   Procedure Laterality Date    LITHOTRIPSY      LUMBAR SPINE SURGERY      NECK SURGERY      OPEN REDUCTION AND INTERNAL FIXATION (ORIF) OF INJURY OF ANKLE Left        SOCIAL HISTORY:  Social History     Socioeconomic History    Marital status:   "  Tobacco Use    Smoking status: Never    Smokeless tobacco: Current     Types: Snuff   Substance and Sexual Activity    Alcohol use: Yes     Comment: 2-3 beers every other day    Drug use: Never       FAMILY HISTORY:  Family History   Problem Relation Age of Onset    Cancer Mother     Liver disease Mother     Pacemaker/defibrilator Father     Hypertension Sister          ALLERGIES:  Review of patient's allergies indicates:   Allergen Reactions    Iodinated contrast media     Shellfish containing products         MEDICATIONS:    Current Outpatient Medications:     amLODIPine (NORVASC) 5 MG tablet, Take 1 tablet (5 mg total) by mouth once daily., Disp: 90 tablet, Rfl: 3    aspirin (ECOTRIN) 81 MG EC tablet, Take 81 mg by mouth once daily., Disp: , Rfl:     clopidogreL (PLAVIX) 75 mg tablet, Take 1 tablet (75 mg total) by mouth once daily., Disp: 90 tablet, Rfl: 3    HYDROmorphone (DILAUDID) 2 MG tablet, Take 1 tablet (2 mg total) by mouth every 4 (four) hours as needed for Pain., Disp: 20 tablet, Rfl: 0    lisinopriL (PRINIVIL,ZESTRIL) 40 MG tablet, Take 1 tablet (40 mg total) by mouth once daily., Disp: 90 tablet, Rfl: 3    metoprolol succinate (TOPROL-XL) 100 MG 24 hr tablet, Take 1 tablet (100 mg total) by mouth once daily., Disp: 90 tablet, Rfl: 3    promethazine (PHENERGAN) 25 MG tablet, Take 1 tablet (25 mg total) by mouth every 4 (four) hours as needed for Nausea., Disp: 20 tablet, Rfl: 0    rosuvastatin (CRESTOR) 10 MG tablet, Take 1 tablet (10 mg total) by mouth once daily., Disp: 90 tablet, Rfl: 3  Medications have been reviewed but not reconciled. He did not bring his meds today.    PHYSICAL EXAM:  /82 (BP Location: Left arm, Patient Position: Sitting)   Pulse 94   Ht 5' 8" (1.727 m)   Wt 107.6 kg (237 lb 3.2 oz)   SpO2 97%   BMI 36.07 kg/m²   Wt Readings from Last 3 Encounters:   02/14/23 107.6 kg (237 lb 3.2 oz)   02/07/23 107 kg (236 lb)   12/22/22 107.5 kg (237 lb)      Body mass index is " 36.07 kg/m².    Physical Exam  Vitals and nursing note reviewed.   Constitutional:       Appearance: Normal appearance. He is obese.   HENT:      Head: Normocephalic and atraumatic.   Eyes:      Pupils: Pupils are equal, round, and reactive to light.   Neck:      Vascular: No carotid bruit.   Cardiovascular:      Rate and Rhythm: Normal rate and regular rhythm.      Pulses: Normal pulses.      Heart sounds: Normal heart sounds.   Pulmonary:      Effort: Pulmonary effort is normal.      Breath sounds: Normal breath sounds.   Abdominal:      General: Bowel sounds are normal.      Palpations: Abdomen is soft.   Musculoskeletal:      Cervical back: Neck supple.      Right lower leg: No edema.      Left lower leg: No edema.   Skin:     General: Skin is warm and dry.      Capillary Refill: Capillary refill takes less than 2 seconds.   Neurological:      General: No focal deficit present.      Mental Status: He is alert and oriented to person, place, and time.   Psychiatric:         Mood and Affect: Mood normal.         Behavior: Behavior normal.       LABS REVIEWED:  Lab Results   Component Value Date    WBC 9.11 2022    RBC 5.12 2022    HGB 15.4 2022    HCT 47.0 2022    MCV 91.8 2022    MCH 30.1 2022    MCHC 32.8 2022    RDW 13.9 2022     2022    MPV 10.1 2022    NRBC 0.0 2022     Lab Results   Component Value Date     2022    K 4.5 2022     (H) 2022    CO2 26 2022    BUN 25 (H) 2022     Lab Results   Component Value Date    AST 18 2022    ALT 33 2022     Lab Results   Component Value Date     (H) 2022    HGBA1C 6.0 2022     Lab Results   Component Value Date    CHOL 204 (H) 2022    HDL 48 2022    TRIG 69 2022    CHOLHDL 4.3 2022       CARDIAC STUDIES REVIEWED:EK2023 RSR with HR 81 bpm; RBBB  2/15/2022 RSR; RBBB; HR 75 bpm      ASSESSMENT:    Patient Active Problem List   Diagnosis    Coronary artery disease    Old MI (myocardial infarction)    Hypertension    Hyperlipidemia    Sleep apnea    Kidney stones    Hydronephrosis, left    Left ureteral stone            Problem List Items Addressed This Visit          Cardiac/Vascular    Coronary artery disease - Primary    Overview     DIAMOND mid LAD x 2 and very prox LAD x 1 2/10/2014         Relevant Medications    rosuvastatin (CRESTOR) 10 MG tablet    clopidogreL (PLAVIX) 75 mg tablet    Other Relevant Orders    EKG 12-lead (Completed)    Lipid Panel    Hyperlipidemia    Overview     Intolerant of Lipitor, Livalo and pravachol         Relevant Medications    rosuvastatin (CRESTOR) 10 MG tablet    Other Relevant Orders    Lipid Panel    Hypertension    Relevant Medications    amLODIPine (NORVASC) 5 MG tablet    lisinopriL (PRINIVIL,ZESTRIL) 40 MG tablet    metoprolol succinate (TOPROL-XL) 100 MG 24 hr tablet     Other Visit Diagnoses       High risk medication use        Relevant Orders    ALT (SGPT)             PLAN:   Orders Placed This Encounter   Procedures    Lipid Panel     Standing Status:   Future     Standing Expiration Date:   4/14/2024    ALT (SGPT)     Standing Status:   Future     Standing Expiration Date:   4/14/2024    EKG 12-lead     Standing Status:   Future     Number of Occurrences:   1     Standing Expiration Date:   2/14/2024      RTC:1 year

## 2024-02-20 ENCOUNTER — OFFICE VISIT (OUTPATIENT)
Dept: CARDIOLOGY | Facility: CLINIC | Age: 64
End: 2024-02-20
Payer: COMMERCIAL

## 2024-02-20 VITALS
HEIGHT: 68 IN | OXYGEN SATURATION: 97 % | WEIGHT: 240.19 LBS | HEART RATE: 78 BPM | DIASTOLIC BLOOD PRESSURE: 90 MMHG | BODY MASS INDEX: 36.4 KG/M2 | SYSTOLIC BLOOD PRESSURE: 124 MMHG

## 2024-02-20 DIAGNOSIS — I10 HYPERTENSION, UNSPECIFIED TYPE: ICD-10-CM

## 2024-02-20 DIAGNOSIS — E78.5 HYPERLIPIDEMIA, UNSPECIFIED HYPERLIPIDEMIA TYPE: ICD-10-CM

## 2024-02-20 DIAGNOSIS — Z79.899 HIGH RISK MEDICATION USE: ICD-10-CM

## 2024-02-20 DIAGNOSIS — I25.10 CORONARY ARTERY DISEASE, UNSPECIFIED VESSEL OR LESION TYPE, UNSPECIFIED WHETHER ANGINA PRESENT, UNSPECIFIED WHETHER NATIVE OR TRANSPLANTED HEART: Primary | ICD-10-CM

## 2024-02-20 DIAGNOSIS — I25.2 OLD MI (MYOCARDIAL INFARCTION): ICD-10-CM

## 2024-02-20 PROCEDURE — 1160F RVW MEDS BY RX/DR IN RCRD: CPT | Mod: CPTII,,, | Performed by: NURSE PRACTITIONER

## 2024-02-20 PROCEDURE — 93005 ELECTROCARDIOGRAM TRACING: CPT | Mod: PBBFAC | Performed by: INTERNAL MEDICINE

## 2024-02-20 PROCEDURE — 4010F ACE/ARB THERAPY RXD/TAKEN: CPT | Mod: CPTII,,, | Performed by: NURSE PRACTITIONER

## 2024-02-20 PROCEDURE — 99214 OFFICE O/P EST MOD 30 MIN: CPT | Mod: S$PBB,,, | Performed by: NURSE PRACTITIONER

## 2024-02-20 PROCEDURE — 3080F DIAST BP >= 90 MM HG: CPT | Mod: CPTII,,, | Performed by: NURSE PRACTITIONER

## 2024-02-20 PROCEDURE — 93010 ELECTROCARDIOGRAM REPORT: CPT | Mod: S$PBB,,, | Performed by: INTERNAL MEDICINE

## 2024-02-20 PROCEDURE — 1159F MED LIST DOCD IN RCRD: CPT | Mod: CPTII,,, | Performed by: NURSE PRACTITIONER

## 2024-02-20 PROCEDURE — 99214 OFFICE O/P EST MOD 30 MIN: CPT | Mod: PBBFAC | Performed by: NURSE PRACTITIONER

## 2024-02-20 PROCEDURE — 3074F SYST BP LT 130 MM HG: CPT | Mod: CPTII,,, | Performed by: NURSE PRACTITIONER

## 2024-02-20 PROCEDURE — 3008F BODY MASS INDEX DOCD: CPT | Mod: CPTII,,, | Performed by: NURSE PRACTITIONER

## 2024-02-20 NOTE — ASSESSMENT & PLAN NOTE
142/100 mmHg  Usually run 130s/80s but he has taken cold meds last 3 days  Rechecked 124/90  Patient will stop his cold meds and take Coricidin HBP OTC when needed.

## 2024-02-20 NOTE — PROGRESS NOTES
PCP: Van Villeda Jr., MD    Referring Provider:     Subjective:   Eligio Felix is a 63 y.o. male with hx of MI/CAD ( DIAMOND prox/mid distal LAD, 2014),  HTN, and HLD, who presents for routine follow up. He is doing well and denies complaints. His bp today is 142/100 mmHg. It usually run 130s/80s but he has taken cold meds last 3 days. BP rechecked today and was 124/90. Patient will stop his cold meds and take Coricidin HBP OTC when needed.    2/14/2023 presents for routine follow up. He having issues with edema to his left ankle but he had a serious injury to it approx 4 months ago requiring extensive surgical repair. The edema improves at night. Other wise he denies complaints.      2/15/2022 routine follow up. He states after the last OV his bp got better but in the last few weeks has gotten bad again. He is wearing his cpap every night. He has arthritis in both shoulders with pain at night requiring meds. He was taking Advil but was changed to Mobic. He has also gained 4 lbs and been off of his usual diet.     Fhx:  Family History   Problem Relation Age of Onset    Cancer Mother     Liver disease Mother     Pacemaker/defibrilator Father     Hypertension Sister      Shx:   Social History     Socioeconomic History    Marital status:    Tobacco Use    Smoking status: Never    Smokeless tobacco: Current     Types: Snuff   Substance and Sexual Activity    Alcohol use: Yes     Comment: 2-3 beers every other day    Drug use: Never       EKG  2/20/2024 RSR with HR 67 bpm; RBBB; no significant change was found when compared with EKG done 2/14/2023 2/14/2023 RSR with HR 81 bpm; RBBB  2/15/2022 RSR; RBBB; HR 75 bpm      ECHO No results found for this or any previous visit.    [facility] 2/10/14  Severe 2 vessel CAD.   Successful revasc. of the prox/mid distal LAD.   EF 60%.   No sig. MR.        Lab Results   Component Value Date     02/07/2022    K 4.5 02/07/2022     (H) 02/07/2022    CO2 26 02/07/2022     BUN 25 (H) 02/07/2022    CREATININE 1.28 02/07/2022    CALCIUM 9.4 02/07/2022    ANIONGAP 10 02/07/2022    EGFRNONAA 61 02/07/2022       Lab Results   Component Value Date    CHOL 204 (H) 11/28/2022    CHOL 216 (H) 02/07/2022    CHOL 230 (H) 07/06/2021     Lab Results   Component Value Date    HDL 48 11/28/2022    HDL 43 02/07/2022    HDL 38 (L) 07/06/2021     Lab Results   Component Value Date    LDLCALC 142 11/28/2022    LDLCALC 150 02/07/2022    LDLCALC 168 07/06/2021     Lab Results   Component Value Date    TRIG 69 11/28/2022    TRIG 116 02/07/2022    TRIG 121 07/06/2021     Lab Results   Component Value Date    CHOLHDL 4.3 11/28/2022    CHOLHDL 5.0 02/07/2022    CHOLHDL 6.1 07/06/2021       Lab Results   Component Value Date    WBC 9.11 02/07/2022    HGB 15.4 02/07/2022    HCT 47.0 02/07/2022    MCV 91.8 02/07/2022     02/07/2022           Current Outpatient Medications:     amLODIPine (NORVASC) 5 MG tablet, Take 1 tablet (5 mg total) by mouth once daily., Disp: 90 tablet, Rfl: 3    aspirin (ECOTRIN) 81 MG EC tablet, Take 81 mg by mouth once daily., Disp: , Rfl:     clopidogreL (PLAVIX) 75 mg tablet, Take 1 tablet (75 mg total) by mouth once daily., Disp: 90 tablet, Rfl: 3    HYDROmorphone (DILAUDID) 2 MG tablet, Take 1 tablet (2 mg total) by mouth every 4 (four) hours as needed for Pain., Disp: 20 tablet, Rfl: 0    lisinopriL (PRINIVIL,ZESTRIL) 40 MG tablet, Take 1 tablet (40 mg total) by mouth once daily., Disp: 90 tablet, Rfl: 3    metoprolol succinate (TOPROL-XL) 100 MG 24 hr tablet, Take 1 tablet (100 mg total) by mouth once daily., Disp: 90 tablet, Rfl: 3    promethazine (PHENERGAN) 25 MG tablet, Take 1 tablet (25 mg total) by mouth every 4 (four) hours as needed for Nausea., Disp: 20 tablet, Rfl: 0    rosuvastatin (CRESTOR) 10 MG tablet, Take 1 tablet (10 mg total) by mouth once daily., Disp: 90 tablet, Rfl: 3    Review of Systems   Respiratory:  Negative for shortness of breath.   "  Cardiovascular:  Negative for chest pain, palpitations, orthopnea, claudication, leg swelling and PND.   Neurological:  Negative for dizziness, loss of consciousness and weakness.           Objective:   BP (!) 124/90   Pulse 78   Ht 5' 8" (1.727 m)   Wt 109 kg (240 lb 3.2 oz)   SpO2 97%   BMI 36.52 kg/m²     Physical Exam  Vitals and nursing note reviewed.   Constitutional:       Appearance: Normal appearance. He is obese.   HENT:      Head: Normocephalic and atraumatic.   Neck:      Vascular: No carotid bruit.   Cardiovascular:      Rate and Rhythm: Normal rate and regular rhythm.      Pulses: Normal pulses.      Heart sounds: Normal heart sounds.   Pulmonary:      Effort: Pulmonary effort is normal.      Breath sounds: Normal breath sounds.   Abdominal:      Palpations: Abdomen is soft.   Musculoskeletal:      Cervical back: Neck supple.      Right lower leg: No edema.      Left lower leg: No edema.   Skin:     General: Skin is warm and dry.      Capillary Refill: Capillary refill takes less than 2 seconds.   Neurological:      Mental Status: He is alert.           Assessment:     1. Coronary artery disease, unspecified vessel or lesion type, unspecified whether angina present, unspecified whether native or transplanted heart  EKG 12-lead      2. Hyperlipidemia, unspecified hyperlipidemia type  Lipid Panel      3. High risk medication use  ALT (SGPT)      4. Hypertension, unspecified type        5. Old MI (myocardial infarction)        6. BMI 36.0-36.9,adult              Plan:   Coronary artery disease  DIAMOND mid LAD x 2 and very prox LAD x 2- 2/10/2014  Asymptomatic  Continue ASA/Plavix/Crestor    Hyperlipidemia  Lipid panel today    Hypertension  142/100 mmHg  Usually run 130s/80s but he has taken cold meds last 3 days  Rechecked 124/90  Patient will stop his cold meds and take Coricidin HBP OTC when needed.    Old MI (myocardial infarction)  NSTEMI 2/10/2014    BMI 36.0-36.9,adult  Discussed options for " weight loss    RTC: 1 year

## 2024-02-21 LAB
OHS QRS DURATION: 150 MS
OHS QTC CALCULATION: 435 MS

## 2024-02-22 ENCOUNTER — TELEPHONE (OUTPATIENT)
Dept: CARDIOLOGY | Facility: CLINIC | Age: 64
End: 2024-02-22
Payer: COMMERCIAL

## 2024-02-22 DIAGNOSIS — E78.5 HYPERLIPIDEMIA, UNSPECIFIED HYPERLIPIDEMIA TYPE: ICD-10-CM

## 2024-02-22 DIAGNOSIS — Z79.899 OTHER LONG TERM (CURRENT) DRUG THERAPY: Primary | ICD-10-CM

## 2024-02-22 NOTE — TELEPHONE ENCOUNTER
Pt notified and verbalized understanding. Pt will try to improve his diet and come at end of April for lab recheck.

## 2024-02-22 NOTE — TELEPHONE ENCOUNTER
----- Message from INDER Jones sent at 2/20/2024 10:49 AM CST -----  Much better but LDL not to goal of <70 mg/dl. He has difficulty tolerating statins. He can improve his diet and recheck in 3 months or if he is doing the best he can, we can add Zetia 10 mg po daily and recheck lipid panel/alt in 2 months

## 2024-03-12 DIAGNOSIS — I10 HYPERTENSION, UNSPECIFIED TYPE: ICD-10-CM

## 2024-03-12 DIAGNOSIS — I25.10 CORONARY ARTERY DISEASE: ICD-10-CM

## 2024-03-12 RX ORDER — METOPROLOL SUCCINATE 100 MG/1
100 TABLET, EXTENDED RELEASE ORAL DAILY
Qty: 90 TABLET | Refills: 3 | Status: SHIPPED | OUTPATIENT
Start: 2024-03-12 | End: 2024-05-13

## 2024-03-12 RX ORDER — AMLODIPINE BESYLATE 5 MG/1
5 TABLET ORAL DAILY
Qty: 90 TABLET | Refills: 3 | Status: SHIPPED | OUTPATIENT
Start: 2024-03-12 | End: 2025-03-12

## 2024-03-12 RX ORDER — CLOPIDOGREL BISULFATE 75 MG/1
75 TABLET ORAL DAILY
Qty: 90 TABLET | Refills: 3 | Status: SHIPPED | OUTPATIENT
Start: 2024-03-12 | End: 2025-03-07

## 2024-03-12 RX ORDER — LISINOPRIL 40 MG/1
40 TABLET ORAL DAILY
Qty: 90 TABLET | Refills: 3 | Status: SHIPPED | OUTPATIENT
Start: 2024-03-12 | End: 2024-05-13

## 2024-05-11 DIAGNOSIS — I10 HYPERTENSION, UNSPECIFIED TYPE: ICD-10-CM

## 2024-05-13 RX ORDER — LISINOPRIL 40 MG/1
40 TABLET ORAL
Qty: 30 TABLET | Refills: 0 | Status: SHIPPED | OUTPATIENT
Start: 2024-05-13

## 2024-05-13 RX ORDER — METOPROLOL SUCCINATE 100 MG/1
100 TABLET, EXTENDED RELEASE ORAL
Qty: 30 TABLET | Refills: 0 | Status: SHIPPED | OUTPATIENT
Start: 2024-05-13

## 2024-07-12 ENCOUNTER — OFFICE VISIT (OUTPATIENT)
Dept: FAMILY MEDICINE | Facility: CLINIC | Age: 64
End: 2024-07-12
Payer: MEDICARE

## 2024-07-12 VITALS
BODY MASS INDEX: 36.15 KG/M2 | HEART RATE: 70 BPM | SYSTOLIC BLOOD PRESSURE: 150 MMHG | TEMPERATURE: 98 F | OXYGEN SATURATION: 97 % | WEIGHT: 238.5 LBS | DIASTOLIC BLOOD PRESSURE: 83 MMHG | HEIGHT: 68 IN

## 2024-07-12 DIAGNOSIS — M51.36 DDD (DEGENERATIVE DISC DISEASE), LUMBAR: Chronic | ICD-10-CM

## 2024-07-12 DIAGNOSIS — M54.31 SCIATICA OF RIGHT SIDE: Primary | Chronic | ICD-10-CM

## 2024-07-12 DIAGNOSIS — N20.0 KIDNEY STONES: Chronic | ICD-10-CM

## 2024-07-12 DIAGNOSIS — I25.10 CORONARY ARTERY DISEASE INVOLVING NATIVE HEART WITHOUT ANGINA PECTORIS, UNSPECIFIED VESSEL OR LESION TYPE: Chronic | ICD-10-CM

## 2024-07-12 DIAGNOSIS — I10 HYPERTENSION, UNSPECIFIED TYPE: Chronic | ICD-10-CM

## 2024-07-12 PROCEDURE — 99213 OFFICE O/P EST LOW 20 MIN: CPT | Mod: ,,, | Performed by: FAMILY MEDICINE

## 2024-07-12 PROCEDURE — 96372 THER/PROPH/DIAG INJ SC/IM: CPT | Mod: ,,, | Performed by: FAMILY MEDICINE

## 2024-07-12 RX ORDER — METHYLPREDNISOLONE ACETATE 80 MG/ML
80 INJECTION, SUSPENSION INTRA-ARTICULAR; INTRALESIONAL; INTRAMUSCULAR; SOFT TISSUE
Status: COMPLETED | OUTPATIENT
Start: 2024-07-12 | End: 2024-07-12

## 2024-07-12 RX ORDER — HYDROCODONE BITARTRATE AND ACETAMINOPHEN 7.5; 325 MG/1; MG/1
1 TABLET ORAL EVERY 8 HOURS PRN
Qty: 12 TABLET | Refills: 0 | Status: ON HOLD | OUTPATIENT
Start: 2024-07-12 | End: 2024-07-17 | Stop reason: ALTCHOICE

## 2024-07-12 RX ADMIN — METHYLPREDNISOLONE ACETATE 80 MG: 80 INJECTION, SUSPENSION INTRA-ARTICULAR; INTRALESIONAL; INTRAMUSCULAR; SOFT TISSUE at 03:07

## 2024-07-12 NOTE — PROGRESS NOTES
Armando Rashid MD   [facility]  [facility] [address] [address]  [phone]      PATIENT NAME: Eligio Felix  : 1960  DATE: 24  MRN: [id number]      Billing Provider: Armando Rashid MD  Level of Service: CT OFFICE/OUTPT VISIT, NEW, LEVL IV, 45-59 MIN  Patient PCP Information       Provider PCP Type    Armando Rashid MD General            Reason for Visit / Chief Complaint: Back Pain       Update PCP  Update Chief Complaint         History of Present Illness / Problem Focused Workflow     Eligio Felix presents to the clinic with Back Pain       62 yo WM who reports low back pain with radiation down right leg to foot.  He reports history of back surgery x 2.  NS is no longer practicing in the area.  Has seen Dr. Sexton in past and would like referral to see him again.  Has been taking ibuprofen for pain.  Also has taken some pain medication that he has for renal stones.  Is followed by urology, Dr. Villeda, and cardiology, Dr. López.          Review of Systems     Review of Systems   Constitutional:  Negative for chills, fatigue and fever.   HENT:  Negative for sinus pressure/congestion, sore throat and trouble swallowing.    Respiratory:  Negative for cough, shortness of breath and wheezing.    Cardiovascular:  Negative for chest pain, palpitations and leg swelling.   Gastrointestinal:  Negative for abdominal pain, change in bowel habit, nausea and vomiting.   Genitourinary:         Kidney stones   Musculoskeletal:  Positive for back pain and leg pain.   Integumentary:  Negative for rash.   Neurological:  Negative for dizziness, syncope, weakness, light-headedness, numbness and headaches.   Psychiatric/Behavioral:  Negative for confusion.         Medical / Social / Family History     Past Medical History:   Diagnosis Date    Broken ankle     left    C. difficile colitis     Coronary artery  disease     CTS (carpal tunnel syndrome)     Diabetes mellitus     Diverticulosis of sigmoid colon     Hydronephrosis, left     Hyperlipidemia     Hypertension     Kidney stones     Left ureteral stone 11/3/2022    Sleep apnea        Past Surgical History:   Procedure Laterality Date    CYSTOURETEROSCOPY, WITH HOLMIUM LASER LITHOTRIPSY OF URETERAL CALCULUS AND STENT INSERTION Right 7/17/2024    Procedure: CYSTOURETEROSCOPY, WITH HOLMIUM LASER LITHOTRIPSY OF URETERAL CALCULUS AND STENT INSERTION;  Surgeon: Van Villeda Jr., MD;  Location: [facility];  Service: Urology;  Laterality: Right;    LITHOTRIPSY      LUMBAR SPINE SURGERY      NECK SURGERY      OPEN REDUCTION AND INTERNAL FIXATION (ORIF) OF INJURY OF ANKLE Left        Social History    reports that he has never smoked. He has never been exposed to tobacco smoke. His smokeless tobacco use includes snuff. He reports current alcohol use. He reports that he does not use drugs.   Social History     Tobacco Use    Smoking status: Never     Passive exposure: Never    Smokeless tobacco: Current     Types: Snuff   Substance Use Topics    Alcohol use: Yes     Comment: 2-3 beers every other day    Drug use: Never       Family History  Family History   Problem Relation Name Age of Onset    Cancer Mother      Liver disease Mother      Pacemaker/defibrilator Father      Hypertension Sister         Medications and Allergies     Medications  Outpatient Medications Marked as Taking for the 7/12/24 encounter (Office Visit) with Armando Rashid MD   Medication Sig Dispense Refill    aspirin (ECOTRIN) 81 MG EC tablet Take 81 mg by mouth once daily.      clopidogreL (PLAVIX) 75 mg tablet Take 1 tablet (75 mg total) by mouth once daily. 90 tablet 3    lisinopriL (PRINIVIL,ZESTRIL) 40 MG tablet Take 1 tablet by mouth once daily 30 tablet 0    metoprolol succinate (TOPROL-XL) 100 MG 24 hr tablet Take 1 tablet by mouth once daily 30 tablet 0       Allergies  Review of  patient's allergies indicates:   Allergen Reactions    Iodinated contrast media      Other Reaction(s): Unknown    Shellfish containing products        Physical Examination     Vitals:    07/12/24 1428   BP: (!) 150/83   Pulse: 70   Temp: 98.4 °F (36.9 °C)     Physical Exam  Vitals reviewed.   Constitutional:       Appearance: Normal appearance.   HENT:      Head: Normocephalic and atraumatic.   Eyes:      Extraocular Movements: Extraocular movements intact.      Conjunctiva/sclera: Conjunctivae normal.      Pupils: Pupils are equal, round, and reactive to light.   Cardiovascular:      Rate and Rhythm: Normal rate and regular rhythm.   Pulmonary:      Effort: Pulmonary effort is normal.      Breath sounds: Normal breath sounds.   Musculoskeletal:         General: Tenderness present.   Skin:     General: Skin is warm and dry.   Neurological:      General: No focal deficit present.      Mental Status: He is alert and oriented to person, place, and time.      Comments: +SLR   Psychiatric:         Mood and Affect: Mood normal.         Behavior: Behavior normal.          Assessment and Plan (including Health Maintenance)      Problem List  Smart Sets  Document Outside HM   :    Plan:         Health Maintenance Due   Topic Date Due    Hepatitis C Screening  Never done    HIV Screening  Never done    TETANUS VACCINE  Never done    High Dose Statin  Never done    Colorectal Cancer Screening  Never done    Shingles Vaccine (1 of 2) Never done    RSV Vaccine (Age 60+ and Pregnant patients) (1 - 1-dose 60+ series) Never done    COVID-19 Vaccine (3 - 2023-24 season) 09/01/2023       Problem List Items Addressed This Visit          Cardiac/Vascular    Coronary artery disease    Overview     DIAMOND mid LAD x 2 and very prox LAD x 1 2/10/2014         Hypertension       Renal/    Kidney stones     Other Visit Diagnoses       Sciatica of right side  (Chronic)   -  Primary    Relevant Medications    methylPREDNISolone acetate  injection 80 mg (Completed)    Other Relevant Orders    Ambulatory referral/consult to Neurosurgery    DDD (degenerative disc disease), lumbar  (Chronic)       Relevant Orders    Ambulatory referral/consult to Neurosurgery            Health Maintenance Topics with due status: Not Due       Topic Last Completion Date    Hemoglobin A1c (Diabetic Prevention Screening) 02/07/2022    Lipid Panel 02/20/2024    Aspirin/Antiplatelet Therapy 07/17/2024    Influenza Vaccine Not Due       Future Appointments   Date Time Provider Department Center   2/20/2025  8:30 AM [facility] Humph[facility]            Signature:  MD MODE Dumas [facility]  [facility]  [address] [place] [id number]  [phone]    Date of encounter: 7/12/24

## 2024-07-15 ENCOUNTER — HOSPITAL ENCOUNTER (OUTPATIENT)
Dept: RADIOLOGY | Facility: HOSPITAL | Age: 64
Discharge: HOME | End: 2024-07-15
Attending: UROLOGY
Payer: MEDICARE

## 2024-07-15 ENCOUNTER — OFFICE VISIT (OUTPATIENT)
Dept: UROLOGY | Facility: CLINIC | Age: 64
End: 2024-07-15
Payer: MEDICARE

## 2024-07-15 VITALS
BODY MASS INDEX: 36.22 KG/M2 | SYSTOLIC BLOOD PRESSURE: 163 MMHG | HEIGHT: 68 IN | DIASTOLIC BLOOD PRESSURE: 89 MMHG | HEART RATE: 73 BPM | WEIGHT: 239 LBS

## 2024-07-15 DIAGNOSIS — R11.0 NAUSEA: ICD-10-CM

## 2024-07-15 DIAGNOSIS — R31.9 URINARY TRACT INFECTION WITH HEMATURIA, SITE UNSPECIFIED: ICD-10-CM

## 2024-07-15 DIAGNOSIS — N23 URETERAL COLIC: ICD-10-CM

## 2024-07-15 DIAGNOSIS — Z12.5 SCREENING PSA (PROSTATE SPECIFIC ANTIGEN): ICD-10-CM

## 2024-07-15 DIAGNOSIS — N20.1 RIGHT URETERAL STONE: Primary | ICD-10-CM

## 2024-07-15 DIAGNOSIS — N39.0 URINARY TRACT INFECTION WITH HEMATURIA, SITE UNSPECIFIED: ICD-10-CM

## 2024-07-15 DIAGNOSIS — N20.0 KIDNEY STONE: Primary | ICD-10-CM

## 2024-07-15 DIAGNOSIS — N20.0 KIDNEY STONE: ICD-10-CM

## 2024-07-15 DIAGNOSIS — Z01.818 PRE-OP TESTING: ICD-10-CM

## 2024-07-15 DIAGNOSIS — Z01.810 PRE-PROCEDURAL CARDIOVASCULAR EXAMINATION: ICD-10-CM

## 2024-07-15 PROCEDURE — 74176 CT ABD & PELVIS W/O CONTRAST: CPT | Mod: 26,,, | Performed by: RADIOLOGY

## 2024-07-15 PROCEDURE — 87086 URINE CULTURE/COLONY COUNT: CPT | Mod: ,,, | Performed by: CLINICAL MEDICAL LABORATORY

## 2024-07-15 PROCEDURE — 99215 OFFICE O/P EST HI 40 MIN: CPT | Mod: S$PBB,25,, | Performed by: UROLOGY

## 2024-07-15 PROCEDURE — 74176 CT ABD & PELVIS W/O CONTRAST: CPT | Mod: TC

## 2024-07-15 PROCEDURE — 74018 RADEX ABDOMEN 1 VIEW: CPT | Mod: 26,,, | Performed by: RADIOLOGY

## 2024-07-15 PROCEDURE — 99215 OFFICE O/P EST HI 40 MIN: CPT | Mod: PBBFAC,25 | Performed by: UROLOGY

## 2024-07-15 PROCEDURE — 96372 THER/PROPH/DIAG INJ SC/IM: CPT | Mod: PBBFAC | Performed by: UROLOGY

## 2024-07-15 PROCEDURE — 99999 PR PBB SHADOW E&M-EST. PATIENT-LVL V: CPT | Mod: PBBFAC,,, | Performed by: UROLOGY

## 2024-07-15 PROCEDURE — 99999PBSHW PR PBB SHADOW TECHNICAL ONLY FILED TO HB: Mod: PBBFAC,,,

## 2024-07-15 PROCEDURE — 74018 RADEX ABDOMEN 1 VIEW: CPT | Mod: TC

## 2024-07-15 RX ORDER — HYDROMORPHONE HYDROCHLORIDE 2 MG/1
2 TABLET ORAL EVERY 4 HOURS PRN
Qty: 16 TABLET | Refills: 0 | Status: SHIPPED | OUTPATIENT
Start: 2024-07-15

## 2024-07-15 RX ORDER — PROMETHAZINE HYDROCHLORIDE 25 MG/1
25 TABLET ORAL EVERY 4 HOURS
Qty: 16 TABLET | Refills: 0 | Status: SHIPPED | OUTPATIENT
Start: 2024-07-15

## 2024-07-15 RX ORDER — HYDROMORPHONE HYDROCHLORIDE 2 MG/ML
2 INJECTION, SOLUTION INTRAMUSCULAR; INTRAVENOUS; SUBCUTANEOUS ONCE
Status: COMPLETED | OUTPATIENT
Start: 2024-07-15 | End: 2024-07-15

## 2024-07-15 RX ORDER — PROMETHAZINE HYDROCHLORIDE 25 MG/ML
25 INJECTION, SOLUTION INTRAMUSCULAR; INTRAVENOUS
Status: COMPLETED | OUTPATIENT
Start: 2024-07-15 | End: 2024-07-15

## 2024-07-15 RX ADMIN — PROMETHAZINE HYDROCHLORIDE 25 MG: 25 INJECTION INTRAMUSCULAR; INTRAVENOUS at 10:07

## 2024-07-15 RX ADMIN — HYDROMORPHONE HYDROCHLORIDE 2 MG: 2 INJECTION INTRAMUSCULAR; INTRAVENOUS; SUBCUTANEOUS at 11:07

## 2024-07-15 NOTE — PATIENT INSTRUCTIONS
KUB revealed no definite stone.  CT was approved and subsequently done.  Reviewed with patient.  He has about a 5 mm stone right mid ureter.  He previously passed the other stone in the kidney which was smaller.  Patient will have right ureteroscopy with laser lithotripsy of stone and stent insertion on Wednesday July 17.  He will present to outpatient surgery at a proximally 9:30 a.m., NPO, and understands it will be mid day before surgery.  Lab data includes a normal PSA of 0.359.  He has an elevated creatinine of 1.52.  He has an elevated globulin 4.1 and will need serum protein electrophoresis probably to make sure there is no problem with that.  Informed consent obtained and patient agreeable to go ahead with the planned surgery on Wednesday 7-17.    Patient given 25 mg Phenergan IM followed 10 minutes later by 2 mg Dilaudid, IM.  Pain relief obtained while patient in the clinic.  Prescription submitted for p.o. Phenergan and Dilaudid after  done.  He understands that is to be used when he needs additional pain relief.

## 2024-07-15 NOTE — H&P (VIEW-ONLY)
"Subjective     Patient ID: Eligio Felix is a 63 y.o. male.    Chief Complaint: Nephrolithiasis (Work in for right lower flank pain, KUB)       Mr. Felix is a very pleasant 61 yo gentleman who was previously seen by Dr. Mathis for stone history dating back 25 years.  He states history of 3 past shockwave lithotripsies in past years.  He passed his last stone without intervention.  He reports onset of left intermittent flank pain with nausea 6 days ago while he was working in [place].  He has been controlling this pain with NSAIDS.  Patient takes plavix daily, followed by Princess Humphrey for history of stents 2014.  UTD on COVID vaccines;  denies problems with prior surgeries.  Pain rated as "6/10" presently.    Lab Results       Component                Value               Date                       CREATININE               1.28                02/07/2022            Dr. Villeda to room to review imaging and evaluate patient.  [1/3/2022]-----------------------------------------------------------------------  Review of Systems   Constitutional: Negative.    HENT: Negative.     Eyes: Negative.    Respiratory: Negative.     Cardiovascular: Negative.    Gastrointestinal: Negative.    Endocrine: Negative.    Genitourinary:  Positive for dysuria, flank pain, frequency, hematuria and urgency. Negative for decreased urine volume, difficulty urinating, enuresis, genital sores, penile discharge, penile pain, penile swelling, scrotal swelling and testicular pain.        Intermittent, irritative voiding complaints.   Allergic/Immunologic: Negative.    Neurological: Negative.    Hematological: Negative.    Psychiatric/Behavioral: Negative.          Objective:  Physical Exam  Vitals and nursing note reviewed.   Constitutional:       General: He is not in acute distress.     Appearance: Normal appearance. He is not ill-appearing, toxic-appearing or diaphoretic.   HENT:      Head: Normocephalic.      Nose: Nose normal.     "  Mouth/Throat:      Mouth: Mucous membranes are moist.      Pharynx: Oropharynx is clear.   Eyes:      General: No scleral icterus.     Conjunctiva/sclera: Conjunctivae normal.      Pupils: Pupils are equal, round, and reactive to light.   Cardiovascular:      Rate and Rhythm: Normal rate and regular rhythm.      Pulses: Normal pulses.      Heart sounds: Normal heart sounds. No murmur heard.  Pulmonary:      Effort: Pulmonary effort is normal.      Breath sounds: Normal breath sounds.   Abdominal:      General: Bowel sounds are normal. There is no distension.      Palpations: Abdomen is soft. There is no mass.      Tenderness: There is no abdominal tenderness. There is no right CVA tenderness, left CVA tenderness or guarding.   Musculoskeletal:         General: Normal range of motion.      Cervical back: Normal range of motion and neck supple.      Right lower leg: No edema.      Left lower leg: No edema.   Skin:     General: Skin is warm and dry.      Capillary Refill: Capillary refill takes less than 2 seconds.      Coloration: Skin is not jaundiced or pale.      Findings: No bruising, erythema, lesion or rash.   Neurological:      General: No focal deficit present.      Mental Status: He is alert and oriented to person, place, and time.   Psychiatric:         Mood and Affect: Mood normal.         Behavior: Behavior normal.         Thought Content: Thought content normal.         Judgment: Judgment normal.          Assessment:         1.         Left ureteral stone   2.         Hydronephrosis of left kidney   3.         Urinary tract obstruction due to kidney stone   4.         Kidney stones   5.         Hydronephrosis, left   6.         Hematuria, unspecified type   7.         Preop examination   8.         Left lower quadrant abdominal pain         Plan:      Left ureteral stone  ·           toradol 60 mg IM  ·           CT Stone protocol 0800 tomorrow morning, will see patient after.  ·           He will likely  have a stent placed since he will need to bee off of Plavix for one week.  ·           Stone appears to be passable stone.  If not passed, will consider intervention.  ·           Dr. Villeda agrees with POC     Hydronephrosis, left  ·           See ureteral stone POC           Electronically signed by SHEREEN Chan at 11/3/2022  4:16 PM  ---------------------------------------------------------------------------------------------------------------------------------------------------------------------------------------------------------------------------------------------------------------------------------  The above note is note of Ms. Mora.  Patient was seen with her yesterday.  Returned today for follow-up CT scan and decision about what we need to do with stone at the left ureteropelvic junction.  He is not having any pain currently and  feels normal.  He does not have to go back to work in [place] until after Thanksgiving.  Patient in with his wife.  He seems to be feeling fine today.  CT reviewed with patient and his wife and the stone that is about a 5 mm stone is present at the left ureteropelvic junction.  There is a dilated left renal pelvis that is presumably chronic although may be worse from the acute stone.  There is fairly good parenchyma of the left kidney that looks essentially the same as the parenchyma of right kidney.  [November 4, 2022]      Mr. Felix is in for follow-up of the left ureteral stone that was found on previous CT.  He has required pain medicine only 1 time in the last 2 weeks.  Has not had any pain in last several days.  He has had some low back pain on left side off and on but severe pain only once.  He has not been aware of stone passing.  Feeling fine today.  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 [  November 21, 2022]     Mr. Felix has had no pain since he was here 1 month ago.  Totally asymptomatic in regards to his stone.  If he  has passed it he is unaware of passing it.  He just came back into town today and will have to go back to [place] to complete his job in a few weeks.  He will be through with current job at the end of January.  No voiding problems and nothing suggesting stone difficulty.  We brought him in for KUB to see if we could see a stone.  Stone not seen well on previous KUB. [December 22, 2022]     Patient is still totally asymptomatic.  He has been unaware of any colicky pain or passage of stone.  We brought him back for CT scan as we could not see any stone on KUB on last trip.  He remains asymptomatic.  No visible blood in urine etc.. [February 7, 2023]    Mr. Felix has been having intermittent pain in his right flank for about 3 weeks.  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  Review of the CT scan done February 20, 2023 reveals he did have 2 stones in his right kidney at that time.  He passed 1 stone several days ago.  KUB today fails to reveal definite stone so probably needs to have a CT the see exactly where the current stone is located.  Presumably it is 1 of the 2 stones that was in the kidney previously as he has seen 1 that has already passed.  He has had several stones previously and says his pain is typical of the colic he has when stone is trying to pass.  No new health problems he is aware of.  He is still on Plavix. [July 15, 2024]  ----------------------------------------------------------------------------------------------------------------------------------------------------------------------------------------------------------------------------------  CT RENAL STONE STUDY ABD PELVIS WO     CLINICAL HISTORY:  Flank pain, kidney stone suspected;right lower flank pain, stone not seen on KUB;     TECHNIQUE:  Axial CT imaging of the abdomen and pelvis is performed without contrast.     CT dose  reduction technique used - Dose Rite and tube current modulation.     COMPARISON:  7 February 2023     FINDINGS:  Cardiac and lung bases are within normal limits     CT abdomen: The liver, spleen, pancreas and adrenal glands are normal in size and density.  No evidence of focal lesion is demonstrated in these solid organs.     There is mild-to-moderate right hydronephrosis and hydroureter with calculus in the proximal ureter measuring 4.7 mm.  Kidneys otherwise appear normal..     The bowel caliber is normal and no wall thickening or adjacent inflammatory change is seen.  No evidence of free fluid or free air is present.  Appendix is normal.     CT pelvis: Multiple diverticula in the sigmoid colon , no findings of diverticulitis.  Otherwise the bowel and bladder appear within normal limits.  The pelvic organs show no evidence of abnormality     Impression:     4.7 mm calculus right proximal ureter with mild-to-moderate right hydronephrosis.        Electronically signed by:Adrián Sams  Date:                                            07/15/2024  ----------------------------------------------------------------------------------------------------------------------------------------------------------------------------------------------------------------------------------------------  CT reviewed with the patient and wife.  He has about a 5 mm stone in the mid right ureter.  It is above the level of the iliac vessels.  He wishes to go ahead with definitive treatment as he does not want to wait because there was a high probability that stone will not be likely to move rapidly and probably will not pass on its own.  He does not want to return to work in [place] without having the stone taken care of 1st.  Patient will have right ureteroscopy with laser lithotripsy of stone and stent insertion on Wednesday July 17 under general anesthesia. [July 15, 2024]           Review of Systems       Objective     Physical  Exam  Constitutional:       Appearance: He is normal weight. He is ill-appearing.   HENT:      Head: Normocephalic.      Right Ear: External ear normal.      Left Ear: External ear normal.      Nose: Nose normal.      Mouth/Throat:      Mouth: Mucous membranes are moist.      Pharynx: Oropharynx is clear.   Eyes:      Pupils: Pupils are equal, round, and reactive to light.   Neck:      Vascular: No carotid bruit.   Cardiovascular:      Rate and Rhythm: Normal rate and regular rhythm.      Heart sounds: Normal heart sounds.   Pulmonary:      Effort: Pulmonary effort is normal.      Breath sounds: Normal breath sounds.   Abdominal:      General: There is no distension.      Palpations: Abdomen is soft.      Tenderness: There is no abdominal tenderness. There is right CVA tenderness. There is no guarding.      Hernia: No hernia is present.   Genitourinary:     Prostate: Normal.      Rectum: Normal.      Comments: Prostate 20-25 g smooth firm symmetrical  Musculoskeletal:         General: Normal range of motion.      Cervical back: Normal range of motion and neck supple.   Lymphadenopathy:      Cervical: No cervical adenopathy.   Skin:     General: Skin is warm and dry.   Neurological:      General: No focal deficit present.      Mental Status: He is alert.   Psychiatric:         Mood and Affect: Mood normal.         Behavior: Behavior normal.     Urinalysis reveals several red blood cells and occasional pus.  The dipstick had 3+ blood with a trace protein, pH 5.0, and specific gravity 1.025.     Assessment and Plan     1. Right ureteral stone  -     promethazine (PHENERGAN) 25 MG tablet; Take 1 tablet (25 mg total) by mouth every 4 (four) hours.  Dispense: 16 tablet; Refill: 0  -     HYDROmorphone (DILAUDID) 2 MG tablet; Take 1 tablet (2 mg total) by mouth every 4 (four) hours as needed for Pain.  Dispense: 16 tablet; Refill: 0  -     Case Request Operating Room: CYSTOURETEROSCOPY, WITH HOLMIUM LASER LITHOTRIPSY OF  URETERAL CALCULUS AND STENT INSERTION    2. Nausea  -     promethazine injection 25 mg  -     CT Renal Stone Study ABD Pelvis WO; Future; Expected date: 07/15/2024    3. Kidney stone  -     promethazine injection 25 mg  -     CT Renal Stone Study ABD Pelvis WO; Future; Expected date: 07/15/2024  -     HYDROmorphone (PF) injection 2 mg    4. Urinary tract infection with hematuria, site unspecified  -     Urine culture; Future; Expected date: 07/15/2024    5. Pre-procedural cardiovascular examination  -     EKG 12-lead; Future; Expected date: 07/15/2024    6. Pre-op testing  -     CBC Auto Differential; Future; Expected date: 07/15/2024  -     Comprehensive Metabolic Panel; Future; Expected date: 07/15/2024    7. Screening PSA (prostate specific antigen)  -     PSA, Screening; Future; Expected date: 07/15/2024    8. Ureteral colic          KUB revealed no definite stone.  CT was approved and subsequently done.  Reviewed with patient.  He has about a 5 mm stone right mid ureter.  He previously passed the other stone in the kidney which was smaller.  Patient will have right ureteroscopy with laser lithotripsy of stone and stent insertion on Wednesday July 17.  He will present to outpatient surgery at a proximally 9:30 a.m., NPO, and understands it will be mid day before surgery.  Lab data includes a normal PSA of 0.359.  He has an elevated creatinine of 1.52.  He has an elevated globulin 4.1 and will need serum protein electrophoresis probably to make sure there is no problem with that.  Informed consent obtained and patient agreeable to go ahead with the planned surgery on Wednesday 7-17.   Patient given 25 mg Phenergan IM followed 10 minutes later by 2 mg Dilaudid, IM.  Pain relief obtained while patient in the clinic.  Prescription submitted for p.o. Phenergan and Dilaudid after  done.  He understands that is to be used when he needs additional pain relief.        No follow-ups on file.

## 2024-07-16 DIAGNOSIS — N20.1 RIGHT URETERAL STONE: Primary | ICD-10-CM

## 2024-07-16 RX ORDER — SODIUM CHLORIDE, SODIUM LACTATE, POTASSIUM CHLORIDE, CALCIUM CHLORIDE 600; 310; 30; 20 MG/100ML; MG/100ML; MG/100ML; MG/100ML
INJECTION, SOLUTION INTRAVENOUS CONTINUOUS
Status: CANCELLED | OUTPATIENT
Start: 2024-07-17

## 2024-07-16 RX ORDER — CEFAZOLIN SODIUM 2 G/50ML
2 SOLUTION INTRAVENOUS ONCE
Status: CANCELLED | OUTPATIENT
Start: 2024-07-17

## 2024-07-17 ENCOUNTER — ANESTHESIA EVENT (OUTPATIENT)
Dept: SURGERY | Facility: HOSPITAL | Age: 64
End: 2024-07-17
Payer: MEDICARE

## 2024-07-17 ENCOUNTER — HOSPITAL ENCOUNTER (OUTPATIENT)
Facility: HOSPITAL | Age: 64
Discharge: HOME | End: 2024-07-17
Attending: UROLOGY | Admitting: UROLOGY
Payer: MEDICARE

## 2024-07-17 ENCOUNTER — ANESTHESIA (OUTPATIENT)
Dept: SURGERY | Facility: HOSPITAL | Age: 64
End: 2024-07-17
Payer: MEDICARE

## 2024-07-17 VITALS
SYSTOLIC BLOOD PRESSURE: 170 MMHG | OXYGEN SATURATION: 98 % | HEART RATE: 71 BPM | WEIGHT: 238 LBS | HEIGHT: 67 IN | BODY MASS INDEX: 37.35 KG/M2 | DIASTOLIC BLOOD PRESSURE: 87 MMHG | TEMPERATURE: 98 F | RESPIRATION RATE: 16 BRPM

## 2024-07-17 DIAGNOSIS — N20.1 RIGHT URETERAL STONE: Primary | ICD-10-CM

## 2024-07-17 LAB
GLUCOSE SERPL-MCNC: 84 MG/DL (ref 70–105)
UA COMPLETE W REFLEX CULTURE PNL UR: NO GROWTH

## 2024-07-17 PROCEDURE — 37000008 HC ANESTHESIA 1ST 15 MINUTES: Performed by: UROLOGY

## 2024-07-17 PROCEDURE — 63600175 PHARM REV CODE 636 W HCPCS: Performed by: UROLOGY

## 2024-07-17 PROCEDURE — 63600175 PHARM REV CODE 636 W HCPCS: Performed by: NURSE ANESTHETIST, CERTIFIED REGISTERED

## 2024-07-17 PROCEDURE — D9220A PRA ANESTHESIA: Mod: ANES,,, | Performed by: ANESTHESIOLOGY

## 2024-07-17 PROCEDURE — 37000009 HC ANESTHESIA EA ADD 15 MINS: Performed by: UROLOGY

## 2024-07-17 PROCEDURE — 71000033 HC RECOVERY, INTIAL HOUR: Performed by: UROLOGY

## 2024-07-17 PROCEDURE — C1758 CATHETER, URETERAL: HCPCS | Performed by: UROLOGY

## 2024-07-17 PROCEDURE — 84165 PROTEIN E-PHORESIS SERUM: CPT | Mod: 26,,, | Performed by: PATHOLOGY

## 2024-07-17 PROCEDURE — C2617 STENT, NON-COR, TEM W/O DEL: HCPCS | Performed by: UROLOGY

## 2024-07-17 PROCEDURE — 36415 COLL VENOUS BLD VENIPUNCTURE: CPT | Performed by: UROLOGY

## 2024-07-17 PROCEDURE — 25000003 PHARM REV CODE 250: Performed by: UROLOGY

## 2024-07-17 PROCEDURE — 52356 CYSTO/URETERO W/LITHOTRIPSY: CPT | Mod: RT,,, | Performed by: UROLOGY

## 2024-07-17 PROCEDURE — 36000707: Performed by: UROLOGY

## 2024-07-17 PROCEDURE — C1769 GUIDE WIRE: HCPCS | Performed by: UROLOGY

## 2024-07-17 PROCEDURE — 25000003 PHARM REV CODE 250: Performed by: NURSE ANESTHETIST, CERTIFIED REGISTERED

## 2024-07-17 PROCEDURE — 82962 GLUCOSE BLOOD TEST: CPT

## 2024-07-17 PROCEDURE — 36000706: Performed by: UROLOGY

## 2024-07-17 PROCEDURE — 71000015 HC POSTOP RECOV 1ST HR: Performed by: UROLOGY

## 2024-07-17 PROCEDURE — 27201423 OPTIME MED/SURG SUP & DEVICES STERILE SUPPLY: Performed by: UROLOGY

## 2024-07-17 PROCEDURE — D9220A PRA ANESTHESIA: Mod: CRNA,,, | Performed by: NURSE ANESTHETIST, CERTIFIED REGISTERED

## 2024-07-17 PROCEDURE — 84165 PROTEIN E-PHORESIS SERUM: CPT | Performed by: UROLOGY

## 2024-07-17 PROCEDURE — 71000016 HC POSTOP RECOV ADDL HR: Performed by: UROLOGY

## 2024-07-17 DEVICE — VARIABLE LENGTH URETERAL STENT
Type: IMPLANTABLE DEVICE | Site: URETER | Status: FUNCTIONAL
Brand: CONTOUR VL™

## 2024-07-17 RX ORDER — MORPHINE SULFATE 10 MG/ML
4 INJECTION INTRAMUSCULAR; INTRAVENOUS; SUBCUTANEOUS EVERY 5 MIN PRN
Status: DISCONTINUED | OUTPATIENT
Start: 2024-07-17 | End: 2024-07-17 | Stop reason: HOSPADM

## 2024-07-17 RX ORDER — FENTANYL CITRATE 50 UG/ML
INJECTION, SOLUTION INTRAMUSCULAR; INTRAVENOUS
Status: DISCONTINUED | OUTPATIENT
Start: 2024-07-17 | End: 2024-07-17

## 2024-07-17 RX ORDER — ROCURONIUM BROMIDE 10 MG/ML
INJECTION, SOLUTION INTRAVENOUS
Status: DISCONTINUED | OUTPATIENT
Start: 2024-07-17 | End: 2024-07-17

## 2024-07-17 RX ORDER — DIPHENHYDRAMINE HYDROCHLORIDE 50 MG/ML
25 INJECTION INTRAMUSCULAR; INTRAVENOUS EVERY 6 HOURS PRN
Status: DISCONTINUED | OUTPATIENT
Start: 2024-07-17 | End: 2024-07-17 | Stop reason: HOSPADM

## 2024-07-17 RX ORDER — SODIUM CHLORIDE, SODIUM LACTATE, POTASSIUM CHLORIDE, CALCIUM CHLORIDE 600; 310; 30; 20 MG/100ML; MG/100ML; MG/100ML; MG/100ML
INJECTION, SOLUTION INTRAVENOUS CONTINUOUS
Status: DISCONTINUED | OUTPATIENT
Start: 2024-07-17 | End: 2024-07-17 | Stop reason: HOSPADM

## 2024-07-17 RX ORDER — LIDOCAINE HYDROCHLORIDE 20 MG/ML
INJECTION INTRAVENOUS
Status: DISCONTINUED | OUTPATIENT
Start: 2024-07-17 | End: 2024-07-17

## 2024-07-17 RX ORDER — PROPOFOL 10 MG/ML
VIAL (ML) INTRAVENOUS
Status: DISCONTINUED | OUTPATIENT
Start: 2024-07-17 | End: 2024-07-17

## 2024-07-17 RX ORDER — MIDAZOLAM HYDROCHLORIDE 1 MG/ML
INJECTION INTRAMUSCULAR; INTRAVENOUS
Status: DISCONTINUED | OUTPATIENT
Start: 2024-07-17 | End: 2024-07-17

## 2024-07-17 RX ORDER — SODIUM CHLORIDE, SODIUM LACTATE, POTASSIUM CHLORIDE, CALCIUM CHLORIDE 600; 310; 30; 20 MG/100ML; MG/100ML; MG/100ML; MG/100ML
INJECTION, SOLUTION INTRAVENOUS CONTINUOUS
OUTPATIENT
Start: 2024-07-17

## 2024-07-17 RX ORDER — ONDANSETRON HYDROCHLORIDE 2 MG/ML
4 INJECTION, SOLUTION INTRAVENOUS DAILY PRN
Status: DISCONTINUED | OUTPATIENT
Start: 2024-07-17 | End: 2024-07-17 | Stop reason: HOSPADM

## 2024-07-17 RX ORDER — LIDOCAINE HYDROCHLORIDE 10 MG/ML
1 INJECTION, SOLUTION EPIDURAL; INFILTRATION; INTRACAUDAL; PERINEURAL ONCE
OUTPATIENT
Start: 2024-07-17 | End: 2024-07-17

## 2024-07-17 RX ORDER — GLUCAGON 1 MG
1 KIT INJECTION
Status: DISCONTINUED | OUTPATIENT
Start: 2024-07-17 | End: 2024-07-17 | Stop reason: HOSPADM

## 2024-07-17 RX ORDER — OXYCODONE HYDROCHLORIDE 5 MG/1
5 TABLET ORAL
Status: DISCONTINUED | OUTPATIENT
Start: 2024-07-17 | End: 2024-07-17 | Stop reason: HOSPADM

## 2024-07-17 RX ORDER — ONDANSETRON HYDROCHLORIDE 2 MG/ML
INJECTION, SOLUTION INTRAVENOUS
Status: DISCONTINUED | OUTPATIENT
Start: 2024-07-17 | End: 2024-07-17

## 2024-07-17 RX ORDER — SODIUM CHLORIDE, SODIUM LACTATE, POTASSIUM CHLORIDE, CALCIUM CHLORIDE 600; 310; 30; 20 MG/100ML; MG/100ML; MG/100ML; MG/100ML
125 INJECTION, SOLUTION INTRAVENOUS CONTINUOUS
Status: DISCONTINUED | OUTPATIENT
Start: 2024-07-17 | End: 2024-07-17 | Stop reason: HOSPADM

## 2024-07-17 RX ORDER — GLYCOPYRROLATE 0.2 MG/ML
INJECTION INTRAMUSCULAR; INTRAVENOUS
Status: DISCONTINUED | OUTPATIENT
Start: 2024-07-17 | End: 2024-07-17

## 2024-07-17 RX ORDER — OXYCODONE HYDROCHLORIDE 5 MG/1
5 TABLET ORAL EVERY 4 HOURS PRN
Status: DISCONTINUED | OUTPATIENT
Start: 2024-07-17 | End: 2024-07-17 | Stop reason: HOSPADM

## 2024-07-17 RX ORDER — HYDROMORPHONE HYDROCHLORIDE 2 MG/ML
0.5 INJECTION, SOLUTION INTRAMUSCULAR; INTRAVENOUS; SUBCUTANEOUS EVERY 5 MIN PRN
Status: DISCONTINUED | OUTPATIENT
Start: 2024-07-17 | End: 2024-07-17 | Stop reason: HOSPADM

## 2024-07-17 RX ORDER — ONDANSETRON 4 MG/1
8 TABLET, ORALLY DISINTEGRATING ORAL EVERY 8 HOURS PRN
Status: DISCONTINUED | OUTPATIENT
Start: 2024-07-17 | End: 2024-07-17 | Stop reason: HOSPADM

## 2024-07-17 RX ORDER — DEXAMETHASONE SODIUM PHOSPHATE 4 MG/ML
INJECTION, SOLUTION INTRA-ARTICULAR; INTRALESIONAL; INTRAMUSCULAR; INTRAVENOUS; SOFT TISSUE
Status: DISCONTINUED | OUTPATIENT
Start: 2024-07-17 | End: 2024-07-17

## 2024-07-17 RX ORDER — MEPERIDINE HYDROCHLORIDE 25 MG/ML
25 INJECTION INTRAMUSCULAR; INTRAVENOUS; SUBCUTANEOUS EVERY 10 MIN PRN
Status: DISCONTINUED | OUTPATIENT
Start: 2024-07-17 | End: 2024-07-17 | Stop reason: HOSPADM

## 2024-07-17 RX ADMIN — CEFAZOLIN 2 G: 2 INJECTION, POWDER, FOR SOLUTION INTRAMUSCULAR; INTRAVENOUS at 04:07

## 2024-07-17 RX ADMIN — LIDOCAINE HYDROCHLORIDE 100 MG: 20 INJECTION, SOLUTION INTRAVENOUS at 03:07

## 2024-07-17 RX ADMIN — PROPOFOL 200 MG: 10 INJECTION, EMULSION INTRAVENOUS at 03:07

## 2024-07-17 RX ADMIN — FENTANYL CITRATE 100 MCG: 50 INJECTION INTRAMUSCULAR; INTRAVENOUS at 03:07

## 2024-07-17 RX ADMIN — SUGAMMADEX 200 MG: 100 INJECTION, SOLUTION INTRAVENOUS at 05:07

## 2024-07-17 RX ADMIN — GLYCOPYRROLATE 0.2 MG: 0.2 INJECTION INTRAMUSCULAR; INTRAVENOUS at 04:07

## 2024-07-17 RX ADMIN — DEXAMETHASONE SODIUM PHOSPHATE 10 MG: 4 INJECTION, SOLUTION INTRA-ARTICULAR; INTRALESIONAL; INTRAMUSCULAR; INTRAVENOUS; SOFT TISSUE at 04:07

## 2024-07-17 RX ADMIN — MIDAZOLAM HYDROCHLORIDE 2 MG: 1 INJECTION, SOLUTION INTRAMUSCULAR; INTRAVENOUS at 03:07

## 2024-07-17 RX ADMIN — ONDANSETRON 4 MG: 2 INJECTION INTRAMUSCULAR; INTRAVENOUS at 03:07

## 2024-07-17 RX ADMIN — ROCURONIUM BROMIDE 10 MG: 10 INJECTION, SOLUTION INTRAVENOUS at 04:07

## 2024-07-17 RX ADMIN — SODIUM CHLORIDE, POTASSIUM CHLORIDE, SODIUM LACTATE AND CALCIUM CHLORIDE: 600; 310; 30; 20 INJECTION, SOLUTION INTRAVENOUS at 09:07

## 2024-07-17 RX ADMIN — ROCURONIUM BROMIDE 50 MG: 10 INJECTION, SOLUTION INTRAVENOUS at 03:07

## 2024-07-17 NOTE — DISCHARGE INSTRUCTIONS
DRINK EXTRA FLUIDS TO HELP FLUSH  OUT  THE BLOOD, STRAIN URINE AND SAVE ANY FRAGMENTS AND BRING TO OFFICE ON RETURN. CALL THE OFFICE ON MONDAY IF YOU HAVEN'T ANYTHING ABOUT AN APPOINTMENT. [phone]

## 2024-07-17 NOTE — OP NOTE
[facility] - Periop Services  General Surgery  Operative Note    SUMMARY     Date of Procedure: 7/17/2024     Procedure: Procedure(s) (LRB):  CYSTOURETEROSCOPY, WITH HOLMIUM LASER LITHOTRIPSY OF URETERAL CALCULUS AND STENT INSERTION (Right)       Surgeons and Role:     * Van Villeda Jr., MD - Primary    Assistant:  JORGE TATE    Pre-Operative Diagnosis: Right ureteral stone [N20.1] with intermittent severe colic    Post-Operative Diagnosis: Post-Op Diagnosis Codes:     * Right ureteral stone [N20.1] with intermittent severe colic    Anesthesia: General endotracheal    Operative Findings (including complications, if any):     Description of Technical Procedures:    The patient was taken to the hospital endoscopy suite.  Satisfactory general endotracheal anesthesia was administered.  He was placed in the modified dorsal lithotomy position preparing him for right ureteroscopy.  Usual preparation was carried out and draping system applied for ureteroscopy.  The urethra calibrated through 24 Ukrainian with Jerome bougies without hang up.  The 21 Ukrainian Olympus rigid cystoscope was passed transurethrally into the bladder with the aid of the Olympus digital camera system which was also used for ureteroscopy.  The anterior urethra was clear.  Posterior urethra had early trilobar enlargement without major obstruction.  Bladder was viewed with lateral and Foroblique lens.  No tumors, stones, diverticula or other abnormalities were noted.  Ureteral orifices were normal in size shape, and position.  A ureteral access catheter was passed into the right ureter.  A 0.038 guidewire was passed to the right kidney and seemed to go past the stone in the mid ureter without problems.  The guidewire was left in place and the access catheter was replaced into the right ureter and a 2nd 0.038 guidewire was passed to the right kidney.  The 2 guidewires were left place, bladder emptied, and cystoscope removed.  The Levine long  dual chamber ureteroscope was passed over 1 of the guidewires transurethrally into the bladder and up the right ureter to the stone which was located just above the level of the iliac vessels at the level of L5.  The working wire was removed and a Luv Rink stone basket was passed through the ureteroscope and passed above the stone.  The stone basket was deployed to help hold the stone in place.  A 365 micron holmium laser fiber was passed through the left port of the scope and used to fragment the stone.  The stone fragmented into 3 significant pieces with minimal fragmentation time.  The largest of the 3 pieces was engaged with a stone basket and pulled down the ureter and into the bladder.  Ureteroscope was passed back up the ureter and the remaining 2 pieces were pulled into the bladder.  We passed the scope back up into the upper ureter and only some sand was noted.  I did not see any substantive remaining stone fragments.  Ureteroscope was removed.  The safety wire was converted to the working wire and the cystoscope backloaded over the new working wire into the bladder.  A 7 Armenian variable length ureteral stent was passed into the right ureter and good curl was noted in the renal pelvis.  Guidewire was removed and good curl was noted in the bladder.  The nylon string was left long so that it can be removed by the patient.  Cystoscope was removed and replaced into the bladder to get the stone fragments out.  We did remove all 3 fragments.  Bladder was drained and scope removed.  Fluoroscopy confirmed good placement of right ureteral stent.  The patient was sent to PACU in satisfactory condition having tolerated procedure well.  There were no complications and blood loss was felt to be less than 5 mL.  There were no complications.  The laser time was only 4 seconds resulting in 0.4 kg joules of energy being created.    Significant Surgical Tasks Conducted by the Assistant(s), if Applicable:     Estimated Blood Loss  (EBL): * No values recorded between 7/17/2024  4:15 PM and 7/17/2024  5:08 PM *           Implants:   Implant Name Type Inv. Item Serial No.  Lot No. LRB No. Used Action   STENT CONTOUR VL 7F 22-30CM - [id number]  STENT CONTOUR VL 7F 22-30CM  [employer] [id number] Right 1 Implanted       Specimens:   Specimen (24h ago, onward)      None                    Condition:  Stable    Disposition: PACU - hemodynamically stable.    Attestation: I was present and scrubbed for the entire procedure.

## 2024-07-17 NOTE — ANESTHESIA PROCEDURE NOTES
Intubation    Date/Time: 7/17/2024 3:51 PM    Performed by: Ok Castañeda CRNA  Authorized by: Alexander Spaulding MD    Intubation:     Induction:  Intravenous    Intubated:  Postinduction    Mask Ventilation:  Very difficult with oral airway    Attempts:  1    Attempted By:  CRNA    Method of Intubation:  Direct    Blade:  Cyrus 4    Laryngeal View Grade: Grade IIb - only the arytenoids and epiglottis seen      Difficult Airway Encountered?: No      Complications:  None    Airway Device:  Oral endotracheal tube    Airway Device Size:  7.5    Style/Cuff Inflation:  Cuffed (inflated to minimal occlusive pressure)    Inflation Amount (mL):  10    Tube secured:  22    Secured at:  The gums    Placement Verified By:  Capnometry    Complicating Factors:  None    Findings Post-Intubation:  BS equal bilateral

## 2024-07-17 NOTE — TRANSFER OF CARE
"Anesthesia Transfer of Care Note    Patient: Eligio Felix    Procedure(s) Performed: Procedure(s) (LRB):  CYSTOURETEROSCOPY, WITH HOLMIUM LASER LITHOTRIPSY OF URETERAL CALCULUS AND STENT INSERTION (Right)    Patient location: PACU    Anesthesia Type: general    Transport from OR: Transported from OR on 6-10 L/min O2 by face mask with adequate spontaneous ventilation    Post pain: adequate analgesia    Post assessment: no apparent anesthetic complications    Post vital signs: stable    Level of consciousness: awake and alert    Nausea/Vomiting: no nausea/vomiting    Complications: none    Transfer of care protocol was followed      Last vitals: Visit Vitals  BP (!) 154/82 (BP Location: Left arm, Patient Position: Lying)   Pulse 76   Temp 36.4 °C (97.6 °F) (Oral)   Resp 16   Ht 5' 7" (1.702 m)   Wt 108 kg (238 lb)   SpO2 97%   BMI 37.28 kg/m²     "

## 2024-07-17 NOTE — PROGRESS NOTES
Report given and care released to Santana Parada RN. VSS- 94% RA, HR 74, resp 20, /79. Stent string in place. No active bleeding noted. NADN. Wife at bedside.

## 2024-07-17 NOTE — ANESTHESIA PREPROCEDURE EVALUATION
07/17/2024  Eligio Felix is a 63 y.o., male.      Pre-op Assessment     I have reviewed the Nursing Notes. I have reviewed the NPO Status.   I have reviewed the Medications.     Review of Systems  Anesthesia Hx:  No problems with previous Anesthesia                Social:  Non-Smoker, No Alcohol Use       Hematology/Oncology:  Hematology Normal   Oncology Normal                                   EENT/Dental:  EENT/Dental Normal           Cardiovascular:     Hypertension  Past MI CAD               2014 - MI / stent x2, stable                         Pulmonary:        Sleep Apnea                Renal/:  Chronic Renal Disease                Hepatic/GI:  Hepatic/GI Normal                 Musculoskeletal:  Musculoskeletal Normal                Neurological:  Neurology Normal                                      Endocrine:  Diabetes, poorly controlled, type 2         Obesity / BMI > 30  Dermatological:  Skin Normal    Psych:  Psychiatric Normal                    Physical Exam  General: Well nourished    Airway:  Mallampati: II / II  Mouth Opening: Normal  TM Distance: > 6 cm  Tongue: Normal  Neck ROM: Normal ROM    Chest/Lungs:  Clear to auscultation, Normal Respiratory Rate    Heart:  Rate: Normal  Rhythm: Regular Rhythm        Anesthesia Plan  Type of Anesthesia, risks & benefits discussed:    Anesthesia Type: Gen Supraglottic Airway  Intra-op Monitoring Plan: Standard ASA Monitors  Post Op Pain Control Plan: multimodal analgesia  Induction:  IV  Informed Consent: Informed consent signed with the Patient and all parties understand the risks and agree with anesthesia plan.  All questions answered. Patient consented to blood products? Yes  ASA Score: 3  Day of Surgery Review of History & Physical: H&P Update referred to the surgeon/provider.I have interviewed and examined the patient. I have reviewed  the patient's H&P dated:     Ready For Surgery From Anesthesia Perspective.     .

## 2024-07-17 NOTE — ANESTHESIA POSTPROCEDURE EVALUATION
Anesthesia Post Evaluation    Patient: Eligio Felix    Procedure(s) Performed: Procedure(s) (LRB):  CYSTOURETEROSCOPY, WITH HOLMIUM LASER LITHOTRIPSY OF URETERAL CALCULUS AND STENT INSERTION (Right)    Final Anesthesia Type: general      Patient location during evaluation: PACU  Patient participation: Yes- Able to Participate  Level of consciousness: awake and sedated  Post-procedure vital signs: reviewed and stable  Pain management: adequate  Airway patency: patent    PONV status at discharge: No PONV  Anesthetic complications: no      Cardiovascular status: blood pressure returned to baseline  Respiratory status: unassisted  Hydration status: euvolemic  Follow-up not needed.              Vitals Value Taken Time   /86 07/17/24 1801   Temp 36.4 °C (97.6 °F) 07/17/24 1714   Pulse 70 07/17/24 1801   Resp 12 07/17/24 1740   SpO2 97 % 07/17/24 1801   Vitals shown include unfiled device data.      Event Time   Out of Recovery 17:40:00         Pain/Rob Score: Rob Score: 10 (7/17/2024  5:37 PM)

## 2024-07-18 LAB
ALBUMIN PE, BLOOD: 3.9 G/DL (ref 3.5–5.2)
ALPHA1 GLOB SERPL ELPH-MCNC: 0.3 G/DL (ref 0.1–0.4)
ALPHA2 GLOB SERPL ELPH-MCNC: 0.9 G/DL (ref 0.4–1.3)
B-GLOBULIN SERPL ELPH-MCNC: 0.8 G/DL (ref 0.5–1.5)
GAMMA GLOB SERPL ELPH-MCNC: 0.8 G/DL (ref 0.5–1.8)
PATH INTERP BLD-IMP: NORMAL
PROT SERPL-MCNC: 6.6 G/DL (ref 6.4–8.2)

## 2024-07-19 ENCOUNTER — HOSPITAL ENCOUNTER (EMERGENCY)
Facility: HOSPITAL | Age: 64
Discharge: HOME | End: 2024-07-19
Attending: EMERGENCY MEDICINE
Payer: MEDICARE

## 2024-07-19 VITALS
OXYGEN SATURATION: 96 % | SYSTOLIC BLOOD PRESSURE: 135 MMHG | RESPIRATION RATE: 18 BRPM | WEIGHT: 235 LBS | HEIGHT: 68 IN | DIASTOLIC BLOOD PRESSURE: 77 MMHG | HEART RATE: 53 BPM | BODY MASS INDEX: 35.61 KG/M2 | TEMPERATURE: 98 F

## 2024-07-19 DIAGNOSIS — R10.9 FLANK PAIN: ICD-10-CM

## 2024-07-19 DIAGNOSIS — N39.0 URINARY TRACT INFECTION WITH HEMATURIA, SITE UNSPECIFIED: Primary | ICD-10-CM

## 2024-07-19 DIAGNOSIS — R31.9 URINARY TRACT INFECTION WITH HEMATURIA, SITE UNSPECIFIED: Primary | ICD-10-CM

## 2024-07-19 LAB
ALBUMIN SERPL BCP-MCNC: 2.9 G/DL (ref 3.5–5)
ALBUMIN/GLOB SERPL: 0.8 {RATIO}
ALP SERPL-CCNC: 84 U/L (ref 45–115)
ALT SERPL W P-5'-P-CCNC: 28 U/L (ref 16–61)
ANION GAP SERPL CALCULATED.3IONS-SCNC: 10 MMOL/L (ref 7–16)
AST SERPL W P-5'-P-CCNC: 19 U/L (ref 15–37)
BACTERIA #/AREA URNS HPF: ABNORMAL /HPF
BASOPHILS # BLD AUTO: 0.04 K/UL (ref 0–0.2)
BASOPHILS NFR BLD AUTO: 0.3 % (ref 0–1)
BILIRUB SERPL-MCNC: 0.1 MG/DL (ref ?–1.2)
BILIRUB UR QL STRIP: NEGATIVE
BUN SERPL-MCNC: 26 MG/DL (ref 7–18)
BUN/CREAT SERPL: 16 (ref 6–20)
CALCIUM SERPL-MCNC: 8.6 MG/DL (ref 8.5–10.1)
CHLORIDE SERPL-SCNC: 111 MMOL/L (ref 98–107)
CLARITY UR: ABNORMAL
CO2 SERPL-SCNC: 25 MMOL/L (ref 21–32)
COLOR UR: ABNORMAL
CREAT SERPL-MCNC: 1.67 MG/DL (ref 0.7–1.3)
DIFFERENTIAL METHOD BLD: ABNORMAL
EGFR (NO RACE VARIABLE) (RUSH/TITUS): 45 ML/MIN/1.73M2
EOSINOPHIL # BLD AUTO: 0.07 K/UL (ref 0–0.5)
EOSINOPHIL NFR BLD AUTO: 0.5 % (ref 1–4)
ERYTHROCYTE [DISTWIDTH] IN BLOOD BY AUTOMATED COUNT: 13.2 % (ref 11.5–14.5)
GLOBULIN SER-MCNC: 3.7 G/DL (ref 2–4)
GLUCOSE SERPL-MCNC: 125 MG/DL (ref 74–106)
GLUCOSE UR STRIP-MCNC: NORMAL MG/DL
HCT VFR BLD AUTO: 39.7 % (ref 40–54)
HGB BLD-MCNC: 13 G/DL (ref 13.5–18)
IMM GRANULOCYTES # BLD AUTO: 0.08 K/UL (ref 0–0.04)
IMM GRANULOCYTES NFR BLD: 0.6 % (ref 0–0.4)
KETONES UR STRIP-SCNC: NEGATIVE MG/DL
LEUKOCYTE ESTERASE UR QL STRIP: ABNORMAL
LYMPHOCYTES # BLD AUTO: 2.02 K/UL (ref 1–4.8)
LYMPHOCYTES NFR BLD AUTO: 14 % (ref 27–41)
MCH RBC QN AUTO: 30.3 PG (ref 27–31)
MCHC RBC AUTO-ENTMCNC: 32.7 G/DL (ref 32–36)
MCV RBC AUTO: 92.5 FL (ref 80–96)
MONOCYTES # BLD AUTO: 1.16 K/UL (ref 0–0.8)
MONOCYTES NFR BLD AUTO: 8 % (ref 2–6)
MPC BLD CALC-MCNC: 10 FL (ref 9.4–12.4)
MUCOUS, UA: ABNORMAL /LPF
NEUTROPHILS # BLD AUTO: 11.05 K/UL (ref 1.8–7.7)
NEUTROPHILS NFR BLD AUTO: 76.6 % (ref 53–65)
NITRITE UR QL STRIP: NEGATIVE
NRBC # BLD AUTO: 0 X10E3/UL
NRBC, AUTO (.00): 0 %
PH UR STRIP: 6 PH UNITS
PLATELET # BLD AUTO: 363 K/UL (ref 150–400)
POTASSIUM SERPL-SCNC: 3.9 MMOL/L (ref 3.5–5.1)
PROT SERPL-MCNC: 6.6 G/DL (ref 6.4–8.2)
PROT UR QL STRIP: 100
RBC # BLD AUTO: 4.29 M/UL (ref 4.6–6.2)
RBC # UR STRIP: ABNORMAL /UL
RBC #/AREA URNS HPF: >182 /HPF
SODIUM SERPL-SCNC: 142 MMOL/L (ref 136–145)
SP GR UR STRIP: 1.02
SQUAMOUS #/AREA URNS LPF: ABNORMAL /HPF
UROBILINOGEN UR STRIP-ACNC: NORMAL MG/DL
WBC # BLD AUTO: 14.42 K/UL (ref 4.5–11)
WBC #/AREA URNS HPF: 102 /HPF

## 2024-07-19 PROCEDURE — 87086 URINE CULTURE/COLONY COUNT: CPT | Performed by: EMERGENCY MEDICINE

## 2024-07-19 PROCEDURE — 85025 COMPLETE CBC W/AUTO DIFF WBC: CPT | Performed by: EMERGENCY MEDICINE

## 2024-07-19 PROCEDURE — 96365 THER/PROPH/DIAG IV INF INIT: CPT

## 2024-07-19 PROCEDURE — 99285 EMERGENCY DEPT VISIT HI MDM: CPT | Mod: 25

## 2024-07-19 PROCEDURE — 36415 COLL VENOUS BLD VENIPUNCTURE: CPT | Performed by: EMERGENCY MEDICINE

## 2024-07-19 PROCEDURE — 25000003 PHARM REV CODE 250: Performed by: EMERGENCY MEDICINE

## 2024-07-19 PROCEDURE — 80053 COMPREHEN METABOLIC PANEL: CPT | Performed by: EMERGENCY MEDICINE

## 2024-07-19 PROCEDURE — 81003 URINALYSIS AUTO W/O SCOPE: CPT | Performed by: EMERGENCY MEDICINE

## 2024-07-19 PROCEDURE — 81001 URINALYSIS AUTO W/SCOPE: CPT | Performed by: EMERGENCY MEDICINE

## 2024-07-19 PROCEDURE — 63600175 PHARM REV CODE 636 W HCPCS: Performed by: EMERGENCY MEDICINE

## 2024-07-19 PROCEDURE — 96375 TX/PRO/DX INJ NEW DRUG ADDON: CPT

## 2024-07-19 PROCEDURE — 96361 HYDRATE IV INFUSION ADD-ON: CPT

## 2024-07-19 RX ORDER — CEFDINIR 300 MG/1
300 CAPSULE ORAL 2 TIMES DAILY
Qty: 20 CAPSULE | Refills: 0 | Status: SHIPPED | OUTPATIENT
Start: 2024-07-19 | End: 2024-07-29

## 2024-07-19 RX ORDER — ONDANSETRON HYDROCHLORIDE 2 MG/ML
4 INJECTION, SOLUTION INTRAVENOUS
Status: COMPLETED | OUTPATIENT
Start: 2024-07-19 | End: 2024-07-19

## 2024-07-19 RX ORDER — HYDROMORPHONE HYDROCHLORIDE 2 MG/ML
1 INJECTION, SOLUTION INTRAMUSCULAR; INTRAVENOUS; SUBCUTANEOUS
Status: COMPLETED | OUTPATIENT
Start: 2024-07-19 | End: 2024-07-19

## 2024-07-19 RX ADMIN — SODIUM CHLORIDE 1000 ML: 9 INJECTION, SOLUTION INTRAVENOUS at 02:07

## 2024-07-19 RX ADMIN — HYDROMORPHONE HYDROCHLORIDE 1 MG: 2 INJECTION INTRAMUSCULAR; INTRAVENOUS; SUBCUTANEOUS at 02:07

## 2024-07-19 RX ADMIN — CEFTRIAXONE SODIUM 2 G: 2 INJECTION, POWDER, FOR SOLUTION INTRAMUSCULAR; INTRAVENOUS at 05:07

## 2024-07-19 RX ADMIN — ONDANSETRON 4 MG: 2 INJECTION INTRAMUSCULAR; INTRAVENOUS at 02:07

## 2024-07-19 NOTE — ED PROVIDER NOTES
Encounter Date: 7/19/2024       History     Chief Complaint   Patient presents with    Flank Pain     Persistent right flank pain. Ureteral stent and lithotripsy done by Dr. Roberts 2 days ago. Removed stent tonight but pain has not improved. Pain meds/nausea meds not helping pain.      64 Y/O MALE WITH RIGHT LOWER BACK AND FLANK PAIN AFTER URETERAL STENT THAT WAS PLACED 2 DAYS AGO BY DR ROBERTS.  HE SAYS HE HAS TAKEN 2 DILAUDID WITHOUT ANY IMPROVEMENT.        Review of patient's allergies indicates:   Allergen Reactions    Iodinated contrast media      Other Reaction(s): Unknown    Shellfish containing products      Past Medical History:   Diagnosis Date    Broken ankle     left    C. difficile colitis     Coronary artery disease     CTS (carpal tunnel syndrome)     Diabetes mellitus     Diverticulosis of sigmoid colon     Hydronephrosis, left     Hyperlipidemia     Hypertension     Kidney stones     Left ureteral stone 11/3/2022    Sleep apnea      Past Surgical History:   Procedure Laterality Date    CYSTOURETEROSCOPY, WITH HOLMIUM LASER LITHOTRIPSY OF URETERAL CALCULUS AND STENT INSERTION Right 7/17/2024    Procedure: CYSTOURETEROSCOPY, WITH HOLMIUM LASER LITHOTRIPSY OF URETERAL CALCULUS AND STENT INSERTION;  Surgeon: Van Roberts Jr., MD;  Location: [facility];  Service: Urology;  Laterality: Right;    LITHOTRIPSY      LUMBAR SPINE SURGERY      NECK SURGERY      OPEN REDUCTION AND INTERNAL FIXATION (ORIF) OF INJURY OF ANKLE Left      Family History   Problem Relation Name Age of Onset    Cancer Mother      Liver disease Mother      Pacemaker/defibrilator Father      Hypertension Sister       Social History     Tobacco Use    Smoking status: Never     Passive exposure: Never    Smokeless tobacco: Current     Types: Snuff   Substance Use Topics    Alcohol use: Yes     Comment: 2-3 beers every other day    Drug use: Never     Review of Systems   All other systems reviewed and are negative.      Physical Exam      Initial Vitals [07/19/24 0152]   BP Pulse Resp Temp SpO2   (!) 157/88 74 18 98 °F (36.7 °C) 96 %      MAP       --         Physical Exam    Nursing note and vitals reviewed.  Constitutional: He appears well-developed and well-nourished.   UNCOMFORTABLE-APPEARING.   HENT:   Head: Normocephalic and atraumatic.   Eyes: Conjunctivae and EOM are normal. Pupils are equal, round, and reactive to light.   Neck: Neck supple.   Normal range of motion.  Cardiovascular:  Normal rate, regular rhythm and normal heart sounds.           Pulmonary/Chest: Breath sounds normal.   Abdominal: Abdomen is soft. Bowel sounds are normal.   Musculoskeletal:         General: Normal range of motion.      Cervical back: Normal range of motion and neck supple.     Neurological: He is alert and oriented to person, place, and time. He has normal strength. GCS score is 15. GCS eye subscore is 4. GCS verbal subscore is 5. GCS motor subscore is 6.   Skin: Skin is warm and dry.         Medical Screening Exam   See Full Note    ED Course   Procedures  Labs Reviewed   COMPREHENSIVE METABOLIC PANEL - Abnormal; Notable for the following components:       Result Value    Chloride 111 (*)     Glucose 125 (*)     BUN 26 (*)     Creatinine 1.67 (*)     Albumin 2.9 (*)     eGFR 45 (*)     All other components within normal limits   CBC WITH DIFFERENTIAL - Abnormal; Notable for the following components:    WBC 14.42 (*)     RBC 4.29 (*)     Hemoglobin 13.0 (*)     Hematocrit 39.7 (*)     Neutrophils % 76.6 (*)     Lymphocytes % 14.0 (*)     Monocytes % 8.0 (*)     Eosinophils % 0.5 (*)     Immature Granulocytes % 0.6 (*)     Neutrophils, Abs 11.05 (*)     Monocytes, Absolute 1.16 (*)     Immature Granulocytes, Absolute 0.08 (*)     All other components within normal limits   URINALYSIS, REFLEX TO URINE CULTURE - Abnormal; Notable for the following components:    Color, UA Dark-Red (*)     Leukocytes, UA Moderate (*)     Protein,  (*)     Blood, UA  Large (*)     All other components within normal limits   URINALYSIS, MICROSCOPIC - Abnormal; Notable for the following components:    WBC,  (*)     RBC, UA >182 (*)     Bacteria, UA Occasional (*)     Squamous Epithelial Cells, UA Occasional (*)     Mucous Occasional (*)     All other components within normal limits   CULTURE, URINE   CBC W/ AUTO DIFFERENTIAL    Narrative:     The following orders were created for panel order CBC auto differential.  Procedure                               Abnormality         Status                     ---------                               -----------         ------                     CBC with Differential[[id number]]       Abnormal            Final result                 Please view results for these tests on the individual orders.          Imaging Results              CT Renal Stone Study Abd Pelvis WO (In process)                      Medications   cefTRIAXone (ROCEPHIN) 2 g in D5W 100 mL IVPB (MB+) (has no administration in time range)   HYDROmorphone (PF) injection 1 mg (1 mg Intravenous Given 7/19/24 0237)   ondansetron injection 4 mg (4 mg Intravenous Given 7/19/24 0237)   sodium chloride 0.9% bolus 1,000 mL 1,000 mL (0 mLs Intravenous Stopped 7/19/24 0337)     Medical Decision Making  FLANK PAIN AFTER URETER STENT REMOVED.    DDX:  OBSTRUCTION VS URETEROLITHIASIS VS PYELO VS OTHER    ABX FOR UTI.  FOLLOW UP WITH UROLOGY.      Amount and/or Complexity of Data Reviewed  Labs: ordered. Decision-making details documented in ED Course.  Radiology: ordered. Decision-making details documented in ED Course.    Risk  Prescription drug management.                                      Clinical Impression:   Final diagnoses:  [N39.0, R31.9] Urinary tract infection with hematuria, site unspecified (Primary)  [R10.9] Flank pain        ED Disposition Condition    Discharge Stable          ED Prescriptions    None       Follow-up Information       Follow up With Specialties Details  Why Contact Info    [facility] - Emergency Department Emergency Medicine  As needed [address] [place] [id number] [phone]             Salomon Kunz MD  07/19/24 5987

## 2024-07-19 NOTE — DISCHARGE INSTRUCTIONS
TAKE ANTIBIOTIC AS DIRECTED.  CONTACT DR ROBERTS'S OFFICE FIRST THING THING MORNING REGARDING FOLLOW UP.  RETURN TO THE EMERGENCY DEPARTMENT AS NEEDED.

## 2024-07-19 NOTE — DISCHARGE SUMMARY
Patient ID: Eligio Felix is a 63 y.o. male.     Chief Complaint: Nephrolithiasis (Work in for right lower flank pain, KUB)     Mr. Felix has been having intermittent pain in his right flank for about 3 weeks. 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 Review of the CT scan done February 20, 2023 reveals he did have 2 stones in his right kidney at that time. He passed 1 stone several days ago. KUB today fails to reveal definite stone so probably needs to have a CT the see exactly where the current stone is located. Presumably it is 1 of the 2 stones that was in the kidney previously as he has seen 1 that has already passed. He has had several stones previously and says his pain is typical of the colic he has when stone is trying to pass. No new health problems he is aware of. He is still on Plavix.    CT reviewed with the patient and wife.  He has about a 5 mm stone in the mid right ureter.  It is above the level of the iliac vessels.  He wishes to go ahead with definitive treatment as he does not want to wait because there was a high probability that stone will not be likely to move rapidly and probably will not pass on its own.  He does not want to return to work in [place] without having the stone taken care of 1st.  Patient will have right ureteroscopy with laser lithotripsy of stone and stent insertion on Wednesday July 17 under general anesthesia.      Assessment:  Right ureteral stone  Nausea  Kidney stone  Urinary tract infection with hematuria, site unspecified  Ureteral colic    Plan:  KUB revealed no definite stone.  CT was approved and subsequently done.  Reviewed with patient.  He has about a 5 mm stone right mid ureter.  He previously passed the other stone in the kidney which was smaller.  Patient will have right ureteroscopy with laser lithotripsy of stone and stent insertion on  "Wednesday July 17.  He will present to outpatient surgery at a proximally 9:30 a.m., NPO, and understands it will be mid day before surgery.  Lab data includes a normal PSA of 0.359.  He has an elevated creatinine of 1.52.  He has an elevated globulin 4.1 and will need serum protein electrophoresis probably to make sure there is no problem with that.  Informed consent obtained and patient agreeable to go ahead with the planned surgery on Wednesday 7-17.   Patient given 25 mg Phenergan IM followed 10 minutes later by 2 mg Dilaudid, IM.  Pain relief obtained while patient in the clinic.  Prescription submitted for p.o. Phenergan and Dilaudid after  done.  He understands that is to be used when he needs additional pain relief.   [July 15, 2024]   -------------------------------------------------------------------------------------  The above note is from Dr. Villeda's last clinic visit with Mr. Felix on July 15, 2024.    Mr. Felix was admitted to the outpatient surgery department of [facility] this morning. He was taken to OR 11 where he underwent Cystoscopy, right ureteroscopy with laser lithotripsy of stone, stone manipulation and basket extraction of stone fragments and insertion of right ureteral stent under general endotracheal anesthesia by Dr. Villeda (please see his operative procedure note for complete details). He tolerated the procedure well and was awakened and taken to PACU in stable condition. After PACU, he was returned to [facility]. He had an uneventful recovery requiring no post-op pain medications. He voided adequately post operatively. String was left intact on the distal end of indwelling right ureteral stent. He was given verbal instructions on how to "pull" his ureteral stent.  He will do this first thing Friday morning, July 19, 2024.     Mr. Felix has both pain and nausea meds at home and will take as needed.    Mr. Felix has a return appointment with Dr. Villeda on Monday, August " 19, 2024 at 1:45 p.m.      Mr. Felix is awake, alert and feeling good.  He is requesting to go home.  Patient discharged to home with his wife on same day of admission, July 17, 2024.    KRYSTIN Queen, DAMIÁN,  [facility]

## 2024-07-21 LAB — UA COMPLETE W REFLEX CULTURE PNL UR: NO GROWTH

## 2024-08-19 ENCOUNTER — OFFICE VISIT (OUTPATIENT)
Dept: UROLOGY | Facility: CLINIC | Age: 64
End: 2024-08-19
Payer: MEDICARE

## 2024-08-19 VITALS
DIASTOLIC BLOOD PRESSURE: 95 MMHG | BODY MASS INDEX: 35.16 KG/M2 | HEIGHT: 68 IN | TEMPERATURE: 99 F | SYSTOLIC BLOOD PRESSURE: 156 MMHG | HEART RATE: 78 BPM | WEIGHT: 232 LBS

## 2024-08-19 DIAGNOSIS — N23 URETERAL COLIC: ICD-10-CM

## 2024-08-19 DIAGNOSIS — Z12.5 SCREENING PSA (PROSTATE SPECIFIC ANTIGEN): ICD-10-CM

## 2024-08-19 DIAGNOSIS — Z09 POSTOP CHECK: Primary | ICD-10-CM

## 2024-08-19 DIAGNOSIS — N20.1 RIGHT URETERAL STONE: ICD-10-CM

## 2024-08-19 DIAGNOSIS — N28.9 RENAL DYSFUNCTION: ICD-10-CM

## 2024-08-19 PROCEDURE — 99214 OFFICE O/P EST MOD 30 MIN: CPT | Mod: PBBFAC | Performed by: UROLOGY

## 2024-08-19 PROCEDURE — 99999 PR PBB SHADOW E&M-EST. PATIENT-LVL IV: CPT | Mod: PBBFAC,,, | Performed by: UROLOGY

## 2024-08-19 PROCEDURE — 99024 POSTOP FOLLOW-UP VISIT: CPT | Mod: ,,, | Performed by: UROLOGY

## 2024-08-19 NOTE — PATIENT INSTRUCTIONS
Patient doing well clinically postop.  BMP was done because he had had elevation of creatinine at time of the stone problem.  Creatinine today normal at 1.14.  He was notified of results by telephone after he left clinic.  Needs to have a routine primary care physician or nurse practitioner.  He will be seen by Urology on p.r.n. basis.

## 2024-08-19 NOTE — PROGRESS NOTES
"Subjective     Patient ID: Eligio Felix is a 64 y.o. male.    Chief Complaint: Post-op Evaluation (One month post op Right ureteroscopy on 7/17/24)    Mr. Felix is a very pleasant 61 yo gentleman who was previously seen by Dr. Mathis for stone history dating back 25 years.  He states history of 3 past shockwave lithotripsies in past years.  He passed his last stone without intervention.  He reports onset of left intermittent flank pain with nausea 6 days ago while he was working in [place].  He has been controlling this pain with NSAIDS.  Patient takes plavix daily, followed by [facility] for history of stents 2014.  UTD on COVID vaccines;  denies problems with prior surgeries.  Pain rated as "6/10" presently.    Lab Results       Component                Value               Date                       CREATININE               1.28                02/07/2022            Dr. Villeda to room to review imaging and evaluate patient.  [1/3/2022]-----------------------------------------------------------------------  Review of Systems   Constitutional: Negative.    HENT: Negative.     Eyes: Negative.    Respiratory: Negative.     Cardiovascular: Negative.    Gastrointestinal: Negative.    Endocrine: Negative.    Genitourinary:  Positive for dysuria, flank pain, frequency, hematuria and urgency. Negative for decreased urine volume, difficulty urinating, enuresis, genital sores, penile discharge, penile pain, penile swelling, scrotal swelling and testicular pain.        Intermittent, irritative voiding complaints.   Allergic/Immunologic: Negative.    Neurological: Negative.    Hematological: Negative.    Psychiatric/Behavioral: Negative.          Objective:  Physical Exam  Vitals and nursing note reviewed.   Constitutional:       General: He is not in acute distress.     Appearance: Normal appearance. He is not ill-appearing, toxic-appearing or diaphoretic.   HENT:      Head: Normocephalic.      Nose: Nose " normal.      Mouth/Throat:      Mouth: Mucous membranes are moist.      Pharynx: Oropharynx is clear.   Eyes:      General: No scleral icterus.     Conjunctiva/sclera: Conjunctivae normal.      Pupils: Pupils are equal, round, and reactive to light.   Cardiovascular:      Rate and Rhythm: Normal rate and regular rhythm.      Pulses: Normal pulses.      Heart sounds: Normal heart sounds. No murmur heard.  Pulmonary:      Effort: Pulmonary effort is normal.      Breath sounds: Normal breath sounds.   Abdominal:      General: Bowel sounds are normal. There is no distension.      Palpations: Abdomen is soft. There is no mass.      Tenderness: There is no abdominal tenderness. There is no right CVA tenderness, left CVA tenderness or guarding.   Musculoskeletal:         General: Normal range of motion.      Cervical back: Normal range of motion and neck supple.      Right lower leg: No edema.      Left lower leg: No edema.   Skin:     General: Skin is warm and dry.      Capillary Refill: Capillary refill takes less than 2 seconds.      Coloration: Skin is not jaundiced or pale.      Findings: No bruising, erythema, lesion or rash.   Neurological:      General: No focal deficit present.      Mental Status: He is alert and oriented to person, place, and time.   Psychiatric:         Mood and Affect: Mood normal.         Behavior: Behavior normal.         Thought Content: Thought content normal.         Judgment: Judgment normal.          Assessment:         1.         Left ureteral stone   2.         Hydronephrosis of left kidney   3.         Urinary tract obstruction due to kidney stone   4.         Kidney stones   5.         Hydronephrosis, left   6.         Hematuria, unspecified type   7.         Preop examination   8.         Left lower quadrant abdominal pain         Plan:      Left ureteral stone  ·           toradol 60 mg IM  ·           CT Stone protocol 0800 tomorrow morning, will see patient after.  ·           He  will likely have a stent placed since he will need to bee off of Plavix for one week.  ·           Stone appears to be passable stone.  If not passed, will consider intervention.  ·           Dr. Villeda agrees with POC     Hydronephrosis, left  ·           See ureteral stone POC           Electronically signed by SHEREEN Chan at 11/3/2022  4:16 PM  ---------------------------------------------------------------------------------------------------------------------------------------------------------------------------------------------------------------------------------------------------------------------------------  The above note is note of Ms. Mora.  Patient was seen with her yesterday.  Returned today for follow-up CT scan and decision about what we need to do with stone at the left ureteropelvic junction.  He is not having any pain currently and  feels normal.  He does not have to go back to work in [place] until after Thanksgiving.  Patient in with his wife.  He seems to be feeling fine today.  CT reviewed with patient and his wife and the stone that is about a 5 mm stone is present at the left ureteropelvic junction.  There is a dilated left renal pelvis that is presumably chronic although may be worse from the acute stone.  There is fairly good parenchyma of the left kidney that looks essentially the same as the parenchyma of right kidney.  [November 4, 2022]      Mr. Felix is in for follow-up of the left ureteral stone that was found on previous CT.  He has required pain medicine only 1 time in the last 2 weeks.  Has not had any pain in last several days.  He has had some low back pain on left side off and on but severe pain only once.  He has not been aware of stone passing.  Feeling fine today.  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 [  November 21, 2022]     Mr. Felix has had no pain since he was here 1 month ago.  Totally asymptomatic in regards to his  stone.  If he has passed it he is unaware of passing it.  He just came back into town today and will have to go back to [place] to complete his job in a few weeks.  He will be through with current job at the end of January.  No voiding problems and nothing suggesting stone difficulty.  We brought him in for KUB to see if we could see a stone.  Stone not seen well on previous KUB. [December 22, 2022]     Patient is still totally asymptomatic.  He has been unaware of any colicky pain or passage of stone.  We brought him back for CT scan as we could not see any stone on KUB on last trip.  He remains asymptomatic.  No visible blood in urine etc.. [February 7, 2023]     Mr. Felix has been having intermittent pain in his right flank for about 3 weeks.  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  Review of the CT scan done February 20, 2023 reveals he did have 2 stones in his right kidney at that time.  He passed 1 stone several days ago.  KUB today fails to reveal definite stone so probably needs to have a CT the see exactly where the current stone is located.  Presumably it is 1 of the 2 stones that was in the kidney previously as he has seen 1 that has already passed.  He has had several stones previously and says his pain is typical of the colic he has when stone is trying to pass.  No new health problems he is aware of.  He is still on Plavix. [July 15, 2024]  ----------------------------------------------------------------------------------------------------------------------------------------------------------------------------------------------------------------------------------  CT RENAL STONE STUDY ABD PELVIS WO     CLINICAL HISTORY:  Flank pain, kidney stone suspected;right lower flank pain, stone not seen on KUB;     TECHNIQUE:  Axial CT imaging of the abdomen and pelvis is performed without contrast.      CT dose reduction technique used - Dose Rite and tube current modulation.     COMPARISON:  7 February 2023     FINDINGS:  Cardiac and lung bases are within normal limits     CT abdomen: The liver, spleen, pancreas and adrenal glands are normal in size and density.  No evidence of focal lesion is demonstrated in these solid organs.     There is mild-to-moderate right hydronephrosis and hydroureter with calculus in the proximal ureter measuring 4.7 mm.  Kidneys otherwise appear normal..     The bowel caliber is normal and no wall thickening or adjacent inflammatory change is seen.  No evidence of free fluid or free air is present.  Appendix is normal.     CT pelvis: Multiple diverticula in the sigmoid colon , no findings of diverticulitis.  Otherwise the bowel and bladder appear within normal limits.  The pelvic organs show no evidence of abnormality     Impression:     4.7 mm calculus right proximal ureter with mild-to-moderate right hydronephrosis.        Electronically signed by:Adrián Sams  Date:                                            07/15/2024  ----------------------------------------------------------------------------------------------------------------------------------------------------------------------------------------------------------------------------------------------  CT reviewed with the patient and wife.  He has about a 5 mm stone in the mid right ureter.  It is above the level of the iliac vessels.  He wishes to go ahead with definitive treatment as he does not want to wait because there was a high probability that stone will not be likely to move rapidly and probably will not pass on its own.  He does not want to return to work in [place] without having the stone taken care of 1st.  Patient will have right ureteroscopy with laser lithotripsy of stone and stent insertion on Wednesday July 17 under general anesthesia. [July 15, 2024]    The above is from the clinic note of July 15.    Elvira underwent right ureteroscopy with laser lithotripsy of stone and stent insertion July 17.  He went back to the emergency room on the evening of 7/17 after stent was removed earlier in the day.  The CT at that time revealed no significant hydronephrosis and was basically normal  He returned to normal shortly thereafter and went back work in [place].  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  He did have azotemia on his lab studies and probably needs to have another BMP to see what results are.  May need referral to Nephrology.  He does not normally see a family physician and does not have routine lab studies done.   PSA that we had done last month is low normal at 0.359.  [August 19, 2024]         Review of Systems       Objective     Physical Exam       Assessment and Plan     1. Postop check    2. Renal dysfunction  -     Basic Metabolic Panel; Future; Expected date: 08/19/2024    3. Ureteral colic    4. Right ureteral stone      Patient doing well clinically postop.  BMP was done because he had had elevation of creatinine at time of the stone problem.  Creatinine today normal at 1.14.  He was notified of results by telephone after he left clinic.  Needs to have a routine primary care physician or nurse practitioner.  He will be seen by Urology on p.r.n. basis.        No follow-ups on file.

## 2024-10-21 PROBLEM — N39.0 URINARY TRACT INFECTION WITH HEMATURIA: Status: RESOLVED | Noted: 2024-07-19 | Resolved: 2024-10-21

## 2024-10-21 PROBLEM — R31.9 URINARY TRACT INFECTION WITH HEMATURIA: Status: RESOLVED | Noted: 2024-07-19 | Resolved: 2024-10-21

## 2024-12-17 ENCOUNTER — OFFICE VISIT (OUTPATIENT)
Dept: INTERNAL MEDICINE | Facility: CLINIC | Age: 64
End: 2024-12-17
Payer: MEDICARE

## 2024-12-17 VITALS
SYSTOLIC BLOOD PRESSURE: 144 MMHG | WEIGHT: 223 LBS | DIASTOLIC BLOOD PRESSURE: 82 MMHG | HEIGHT: 68 IN | TEMPERATURE: 97 F | RESPIRATION RATE: 18 BRPM | OXYGEN SATURATION: 97 % | HEART RATE: 78 BPM | BODY MASS INDEX: 33.8 KG/M2

## 2024-12-17 DIAGNOSIS — I10 HYPERTENSION, UNSPECIFIED TYPE: ICD-10-CM

## 2024-12-17 DIAGNOSIS — Z00.00 ROUTINE GENERAL MEDICAL EXAMINATION AT A HEALTH CARE FACILITY: Primary | ICD-10-CM

## 2024-12-17 DIAGNOSIS — N20.0 KIDNEY STONES: ICD-10-CM

## 2024-12-17 DIAGNOSIS — G47.30 SLEEP APNEA, UNSPECIFIED TYPE: ICD-10-CM

## 2024-12-17 DIAGNOSIS — E78.5 HYPERLIPIDEMIA, UNSPECIFIED HYPERLIPIDEMIA TYPE: ICD-10-CM

## 2024-12-17 DIAGNOSIS — I25.10 CORONARY ARTERY DISEASE INVOLVING NATIVE HEART WITHOUT ANGINA PECTORIS, UNSPECIFIED VESSEL OR LESION TYPE: ICD-10-CM

## 2024-12-17 PROCEDURE — 99999 PR PBB SHADOW E&M-EST. PATIENT-LVL V: CPT | Mod: PBBFAC,,, | Performed by: INTERNAL MEDICINE

## 2024-12-17 PROCEDURE — 99204 OFFICE O/P NEW MOD 45 MIN: CPT | Mod: S$PBB,,, | Performed by: INTERNAL MEDICINE

## 2024-12-17 PROCEDURE — 99215 OFFICE O/P EST HI 40 MIN: CPT | Mod: PBBFAC | Performed by: INTERNAL MEDICINE

## 2024-12-17 RX ORDER — ATORVASTATIN CALCIUM 20 MG/1
20 TABLET, FILM COATED ORAL DAILY
Qty: 90 TABLET | Refills: 3 | Status: SHIPPED | OUTPATIENT
Start: 2024-12-17 | End: 2025-12-17

## 2024-12-17 RX ORDER — TRAZODONE HYDROCHLORIDE 50 MG/1
50 TABLET ORAL NIGHTLY
Qty: 30 TABLET | Refills: 11 | Status: SHIPPED | OUTPATIENT
Start: 2024-12-17 | End: 2025-12-17

## 2024-12-17 NOTE — PROGRESS NOTES
Subjective:       Patient ID: Eligio Felix is a 64 y.o. male.    Chief Complaint: Establish Care and Insomnia (Has issue sleeping, has tried OTC melatonin. )    Cannot rest at night for last 2 months  kidney stones removed   Review of Systems   Constitutional:  Negative for appetite change, fatigue and unexpected weight change.   HENT:  Negative for postnasal drip, trouble swallowing and goiter.    Eyes:  Negative for visual disturbance.   Respiratory:  Negative for apnea, choking and chest tightness.    Cardiovascular:  Negative for chest pain and leg swelling.   Gastrointestinal:  Negative for blood in stool and change in bowel habit.   Genitourinary:  Negative for difficulty urinating and frequency.   Musculoskeletal:  Negative for arthralgias, back pain and leg pain.   Integumentary:  Negative for rash.   Neurological:  Negative for memory loss and coordination difficulties.   Hematological:  Negative for adenopathy.   Psychiatric/Behavioral:  Negative for agitation. The patient is not hyperactive.        Objective:      Physical Exam  Vitals and nursing note reviewed.   Constitutional:       Appearance: Normal appearance. He is obese.   HENT:      Head: Normocephalic and atraumatic.      Right Ear: Tympanic membrane, ear canal and external ear normal.      Left Ear: Tympanic membrane, ear canal and external ear normal.      Nose: Nose normal.      Mouth/Throat:      Mouth: Mucous membranes are moist.      Pharynx: Oropharynx is clear.   Eyes:      Extraocular Movements: Extraocular movements intact.      Conjunctiva/sclera: Conjunctivae normal.      Pupils: Pupils are equal, round, and reactive to light.   Cardiovascular:      Rate and Rhythm: Normal rate and regular rhythm.      Pulses: Normal pulses.      Heart sounds: Normal heart sounds.   Pulmonary:      Effort: Pulmonary effort is normal.      Breath sounds: Normal breath sounds.   Abdominal:      General: Abdomen is flat. Bowel sounds are  normal.      Palpations: Abdomen is soft.   Musculoskeletal:         General: Normal range of motion.      Cervical back: Normal range of motion.   Skin:     General: Skin is warm and dry.      Capillary Refill: Capillary refill takes less than 2 seconds.   Neurological:      General: No focal deficit present.      Mental Status: He is alert and oriented to person, place, and time.   Psychiatric:         Mood and Affect: Mood normal.         Behavior: Behavior normal.         Thought Content: Thought content normal.         Judgment: Judgment normal.       Assessment:       1. Routine general medical examination at a health care facility    2. Hypertension, unspecified type    3. Hyperlipidemia, unspecified hyperlipidemia type    4. Coronary artery disease involving native heart without angina pectoris, unspecified vessel or lesion type    5. Kidney stones    6. Sleep apnea, unspecified type    7. BMI 36.0-36.9,adult        Plan:         There are no Patient Instructions on file for this visit.      Problem List Items Addressed This Visit          Cardiac/Vascular    Hypertension    Relevant Orders    CBC Auto Differential    Comprehensive Metabolic Panel    TSH    Hyperlipidemia    Overview     Intolerant of Lipitor, Livalo and pravachol         Relevant Orders    Lipid Panel    Coronary artery disease    Overview     DIAMOND mid LAD x 2 and very prox LAD x 1 2/10/2014            Renal/    Kidney stones       Endocrine    BMI 36.0-36.9,adult       Other    Sleep apnea    Overview     Wears cpap          Other Visit Diagnoses       Routine general medical examination at a health care facility    -  Primary

## 2024-12-18 ENCOUNTER — TELEPHONE (OUTPATIENT)
Dept: CARDIOLOGY | Facility: CLINIC | Age: 64
End: 2024-12-18
Payer: MEDICARE

## 2024-12-18 NOTE — TELEPHONE ENCOUNTER
----- Message from Katerin sent at 12/18/2024  1:05 PM CST -----  Who Called: Geno Felix (wife)    Caller is requesting a sooner appointment.     When is the first available appointment? 1/22/25   Options offered (Virtual Visit, Urgent Care): n/a  Symptoms: bad night sweats and not sleeping       Preferred Method of Contact: Phone Call  Patient's Preferred Phone Number on File: [phone] or his wife Geno at [phone]

## 2025-01-21 ENCOUNTER — TELEPHONE (OUTPATIENT)
Dept: CARDIOLOGY | Facility: CLINIC | Age: 65
End: 2025-01-21
Payer: MEDICARE

## 2025-01-21 NOTE — TELEPHONE ENCOUNTER
"Spoke with pt. Pt states he drove all way from [place] to get here and wants to be seen tomorrow. I explained clinic was possibly opening late and offered [facility]. Pt staes, "I don't understand people not wanting to work when it is iscy." Pt accepted appt with Charles on 1/22 @ 3pm.   "

## 2025-01-22 ENCOUNTER — OFFICE VISIT (OUTPATIENT)
Dept: CARDIOLOGY | Facility: CLINIC | Age: 65
End: 2025-01-22
Payer: MEDICARE

## 2025-01-22 VITALS
SYSTOLIC BLOOD PRESSURE: 160 MMHG | BODY MASS INDEX: 33.91 KG/M2 | HEART RATE: 102 BPM | DIASTOLIC BLOOD PRESSURE: 90 MMHG | HEIGHT: 68 IN | OXYGEN SATURATION: 99 %

## 2025-01-22 DIAGNOSIS — E78.49 OTHER HYPERLIPIDEMIA: ICD-10-CM

## 2025-01-22 DIAGNOSIS — I10 HYPERTENSION, UNSPECIFIED TYPE: Primary | ICD-10-CM

## 2025-01-22 DIAGNOSIS — I25.10 CORONARY ARTERY DISEASE INVOLVING NATIVE CORONARY ARTERY OF NATIVE HEART WITHOUT ANGINA PECTORIS: ICD-10-CM

## 2025-01-22 DIAGNOSIS — G47.9 SLEEP DIFFICULTIES: ICD-10-CM

## 2025-01-22 PROCEDURE — 99212 OFFICE O/P EST SF 10 MIN: CPT | Mod: S$PBB,,, | Performed by: REGISTERED NURSE

## 2025-01-22 PROCEDURE — 93010 ELECTROCARDIOGRAM REPORT: CPT | Mod: S$PBB,,, | Performed by: INTERNAL MEDICINE

## 2025-01-22 PROCEDURE — 99213 OFFICE O/P EST LOW 20 MIN: CPT | Mod: PBBFAC,25 | Performed by: REGISTERED NURSE

## 2025-01-22 PROCEDURE — 93005 ELECTROCARDIOGRAM TRACING: CPT | Mod: PBBFAC | Performed by: INTERNAL MEDICINE

## 2025-01-22 PROCEDURE — 99999 PR PBB SHADOW E&M-EST. PATIENT-LVL III: CPT | Mod: PBBFAC,,, | Performed by: REGISTERED NURSE

## 2025-01-23 PROBLEM — G47.9 SLEEP DIFFICULTIES: Status: ACTIVE | Noted: 2025-01-23

## 2025-01-23 LAB
OHS QRS DURATION: 144 MS
OHS QTC CALCULATION: 485 MS

## 2025-01-23 NOTE — ASSESSMENT & PLAN NOTE
He questions if Lopressor is causing sleep issues. He does report cramps in legs at night> recent labs w/ normal electrolytes. Sleep issues are not felt to be caused by medication likely anxiety related due to stressful job. He states his mind races once he attempts to sleep. Discussed seeing PCP for sleep aid

## 2025-01-23 NOTE — PROGRESS NOTES
PCP: No, Primary Doctor    Referring Provider:     Subjective:   Eligio Felix is a 64 y.o. male with hx of MI/CAD ( DIAMOND prox/mid distal LAD, 2014),  HTN, and HLD, who presents for routine follow up.    1/22/24: reports issues sleeping. He is compliant w/ cpap. He questions if Lopressor is causing sleep issues. He does report cramps in legs at night> recent labs w/ normal electrolytes. Sleep issues are not felt to be caused by medication likely anxiety related due to stressful job. He states his mind races once he attempts to sleep. Discussed seeing PCP for sleep aid    2/2024: He is doing well and denies complaints. His bp today is 142/100 mmHg. It usually run 130s/80s but he has taken cold meds last 3 days. BP rechecked today and was 124/90. Patient will stop his cold meds and take Coricidin HBP OTC when needed.    2/14/2023 presents for routine follow up. He having issues with edema to his left ankle but he had a serious injury to it approx 4 months ago requiring extensive surgical repair. The edema improves at night. Other wise he denies complaints.      2/15/2022 routine follow up. He states after the last OV his bp got better but in the last few weeks has gotten bad again. He is wearing his cpap every night. He has arthritis in both shoulders with pain at night requiring meds. He was taking Advil but was changed to Mobic. He has also gained 4 lbs and been off of his usual diet.     Fhx:  Family History   Problem Relation Name Age of Onset    Cancer Mother      Liver disease Mother      Pacemaker/defibrilator Father      Hypertension Sister       Shx:   Social History     Socioeconomic History    Marital status:    Tobacco Use    Smoking status: Never     Passive exposure: Never    Smokeless tobacco: Current     Types: Snuff   Substance and Sexual Activity    Alcohol use: Yes     Comment: 2-3 beers every other day    Drug use: Never    Sexual activity: Yes     Partners: Female     Social Drivers of  Health     Financial Resource Strain: Low Risk  (7/12/2024)    Overall Financial Resource Strain (CARDIA)     Difficulty of Paying Living Expenses: Not hard at all   Food Insecurity: No Food Insecurity (7/12/2024)    Hunger Vital Sign     Worried About Running Out of Food in the Last Year: Never true     Ran Out of Food in the Last Year: Never true   Physical Activity: Sufficiently Active (7/12/2024)    Exercise Vital Sign     Days of Exercise per Week: 5 days     Minutes of Exercise per Session: 100 min   Stress: Patient Declined (7/12/2024)    [employer] - Occupational Stress Questionnaire     Feeling of Stress : Patient declined   Housing Stability: Unknown (7/12/2024)    Housing Stability Vital Sign     Unable to Pay for Housing in the Last Year: No       EKG    1/22/25: NSR, RBBB  2/20/2024 RSR with HR 67 bpm; RBBB; no significant change was found when compared with EKG done 2/14/2023 2/14/2023 RSR with HR 81 bpm; RBBB  2/15/2022 RSR; RBBB; HR 75 bpm      ECHO No results found for this or any previous visit.    [facility] 2/10[facility]14  Severe 2 vessel CAD.   Successful revasc. of the prox/mid distal LAD.   EF 60%.   No sig. MR.        Lab Results   Component Value Date     12/17/2024    K 4.4 12/17/2024     (H) 12/17/2024    CO2 25 12/17/2024    BUN 16 12/17/2024    CREATININE 1.04 12/17/2024    CALCIUM 8.7 (L) 12/17/2024    ANIONGAP 10 12/17/2024    EGFRNONAA 61 02/07/2022       Lab Results   Component Value Date    CHOL 219 (H) 12/17/2024    CHOL 137 02/20/2024    CHOL 204 (H) 11/28/2022     Lab Results   Component Value Date    HDL 36 12/17/2024    HDL 40 02/20/2024    HDL 48 11/28/2022     Lab Results   Component Value Date    LDLCALC 160 12/17/2024    LDLCALC 86 02/20/2024    LDLCALC 142 11/28/2022     Lab Results   Component Value Date    TRIG 113 12/17/2024    TRIG 55 02/20/2024    TRIG 69 11/28/2022     Lab Results   Component Value Date    CHOLHDL 6.1 12/17/2024     "CHOLHDL 3.4 02/20/2024    CHOLHDL 4.3 11/28/2022       Lab Results   Component Value Date    WBC 9.02 12/17/2024    HGB 14.9 12/17/2024    HCT 45.7 12/17/2024    MCV 94.2 12/17/2024     12/17/2024           Current Outpatient Medications:     aspirin (ECOTRIN) 81 MG EC tablet, Take 81 mg by mouth once daily., Disp: , Rfl:     atorvastatin (LIPITOR) 20 MG tablet, Take 1 tablet (20 mg total) by mouth once daily., Disp: 90 tablet, Rfl: 3    clopidogreL (PLAVIX) 75 mg tablet, Take 1 tablet (75 mg total) by mouth once daily., Disp: 90 tablet, Rfl: 3    lisinopriL (PRINIVIL,ZESTRIL) 40 MG tablet, Take 1 tablet by mouth once daily, Disp: 30 tablet, Rfl: 0    metoprolol succinate (TOPROL-XL) 100 MG 24 hr tablet, Take 1 tablet by mouth once daily, Disp: 30 tablet, Rfl: 0    HYDROmorphone (DILAUDID) 2 MG tablet, Take 1 tablet (2 mg total) by mouth every 4 (four) hours as needed for Pain. (Patient not taking: Reported on 1/22/2025), Disp: 16 tablet, Rfl: 0    promethazine (PHENERGAN) 25 MG tablet, Take 1 tablet (25 mg total) by mouth every 4 (four) hours. (Patient not taking: Reported on 1/22/2025), Disp: 16 tablet, Rfl: 0    rosuvastatin (CRESTOR) 10 MG tablet, Take 1 tablet (10 mg total) by mouth once daily. (Patient not taking: Reported on 7/17/2024), Disp: 90 tablet, Rfl: 3    traZODone (DESYREL) 50 MG tablet, Take 1 tablet (50 mg total) by mouth every evening. (Patient not taking: Reported on 1/22/2025), Disp: 30 tablet, Rfl: 11    Review of Systems   Respiratory:  Negative for shortness of breath.    Cardiovascular:  Negative for chest pain, palpitations, orthopnea, claudication, leg swelling and PND.   Neurological:  Negative for dizziness, loss of consciousness and weakness.           Objective:   BP (!) 160/90 (BP Location: Left arm, Patient Position: Sitting)   Pulse 102   Ht 5' 8" (1.727 m)   SpO2 99%   BMI 33.91 kg/m²     Physical Exam  Vitals and nursing note reviewed.   Constitutional:       " Appearance: Normal appearance. He is obese.   HENT:      Head: Normocephalic and atraumatic.   Neck:      Vascular: No carotid bruit.   Cardiovascular:      Rate and Rhythm: Normal rate and regular rhythm.      Pulses: Normal pulses.      Heart sounds: Normal heart sounds.   Pulmonary:      Effort: Pulmonary effort is normal.      Breath sounds: Normal breath sounds.   Abdominal:      Palpations: Abdomen is soft.   Musculoskeletal:      Cervical back: Neck supple.      Right lower leg: No edema.      Left lower leg: No edema.   Skin:     General: Skin is warm and dry.      Capillary Refill: Capillary refill takes less than 2 seconds.   Neurological:      Mental Status: He is alert.           Assessment:     1. Hypertension, unspecified type  EKG 12-lead    EKG 12-lead      2. Other hyperlipidemia        3. Coronary artery disease involving native coronary artery of native heart without angina pectoris        4. Sleep difficulties              Plan:   Hyperlipidemia  Lipid panel reviewed  Total chol 219  Recently Lipitor resumed by pcp  Patient reports in the past having joint pain with statin  If this continues w/ low dose will consider repatha    Hypertension  Elevated today 160/90  Recheck at 140/88  Continue current medications    Coronary artery disease  DIAMOND mid LAD x 2 and very prox LAD x 2- 2/10/2014  Asymptomatic  Continue ASA/Plavix/statin    Sleep difficulties   He questions if Lopressor is causing sleep issues. He does report cramps in legs at night> recent labs w/ normal electrolytes. Sleep issues are not felt to be caused by medication likely anxiety related due to stressful job. He states his mind races once he attempts to sleep. Discussed seeing PCP for sleep aid      RTC: 1 year

## 2025-01-23 NOTE — ASSESSMENT & PLAN NOTE
Lipid panel reviewed  Total chol 219  Recently Lipitor resumed by pcp  Patient reports in the past having joint pain with statin  If this continues w/ low dose will consider repatha

## 2025-03-20 DIAGNOSIS — I10 HYPERTENSION, UNSPECIFIED TYPE: ICD-10-CM

## 2025-03-20 RX ORDER — LISINOPRIL 40 MG/1
40 TABLET ORAL DAILY
Qty: 30 TABLET | Refills: 5 | Status: SHIPPED | OUTPATIENT
Start: 2025-03-20

## 2025-03-20 NOTE — TELEPHONE ENCOUNTER
----- Message from Citlalli sent at 3/20/2025 10:23 AM CDT -----  Who Called: Eligio WellsothRefill or New Rx:RefillRX Name and Strength:lisinopriL (PRINIVIL,ZESTRIL) 40 MG tablet 30 tablet 0 5/13/2024 - NoSig - Route: Take 1 tablet by mouth once daily - OralSent to pharmacy as: lisinopriL (PRINIVIL,ZESTRIL) 40 MG tabletHow is the patient currently taking it? (ex. 1XDay): 1x dayIs this a 30 day or 90 day RX: wants 90 dayLocal or Mail Order: LocalList of preferred pharmacies on file (remove unneeded): [unfilled]If different Pharmacy is requested, enter Pharmacy information here including location and phone number: [facility] [address] [place] [id number]: [phone] Fax: [phone] Provider: Azeb Humphrey Preferred Method of Contact: Phone CallPatient's Preferred Phone Number on File: [phone] Best Call Back Number, if different: [phone] Information:

## 2025-04-11 DIAGNOSIS — I10 HYPERTENSION, UNSPECIFIED TYPE: ICD-10-CM

## 2025-04-11 RX ORDER — METOPROLOL SUCCINATE 100 MG/1
100 TABLET, EXTENDED RELEASE ORAL DAILY
Qty: 30 TABLET | Refills: 5 | Status: SHIPPED | OUTPATIENT
Start: 2025-04-11

## 2025-04-11 NOTE — TELEPHONE ENCOUNTER
----- Message from Diaz sent at 4/11/2025 11:45 AM CDT -----  Who Called: Eligio Antonio's Preferred Phone Number on File: [phone] Best Call Back Number, if different:[phone] Information: pt was wondering if you can call him in  a refill on metoprlol 100 mg to the  [facility] [address] [place] [address], [place] - [phone] [place] [address]

## 2025-04-15 DIAGNOSIS — I25.10 CORONARY ARTERY DISEASE: ICD-10-CM

## 2025-04-15 RX ORDER — CLOPIDOGREL BISULFATE 75 MG/1
75 TABLET ORAL DAILY
Qty: 90 TABLET | Refills: 1 | Status: SHIPPED | OUTPATIENT
Start: 2025-04-15 | End: 2026-04-10

## 2025-04-15 NOTE — TELEPHONE ENCOUNTER
----- Message from Diaz sent at 4/15/2025 11:28 AM CDT -----  Who Called: Eligio Antonio's Preferred Phone Number on File: [phone] Best Call Back Number, if different:Additional Information: pt wants to see if he can get a 90 day supply refill on his clopidogrel 75 mg ti the  [facility] [address] [place] [address], [place] - [id number] [place] [address]

## 2025-06-06 ENCOUNTER — OFFICE VISIT (OUTPATIENT)
Age: 65
End: 2025-06-06

## 2025-06-06 VITALS
OXYGEN SATURATION: 99 % | SYSTOLIC BLOOD PRESSURE: 136 MMHG | RESPIRATION RATE: 16 BRPM | TEMPERATURE: 98.6 F | HEART RATE: 72 BPM | DIASTOLIC BLOOD PRESSURE: 78 MMHG

## 2025-06-06 DIAGNOSIS — S61.209A OPEN WOUND OF FINGER, INITIAL ENCOUNTER: Primary | ICD-10-CM

## 2025-06-06 RX ORDER — ASPIRIN 81 MG/1
81 TABLET, CHEWABLE ORAL DAILY
COMMUNITY

## 2025-06-06 RX ORDER — SULFAMETHOXAZOLE AND TRIMETHOPRIM 800; 160 MG/1; MG/1
1 TABLET ORAL 2 TIMES DAILY
Qty: 20 TABLET | Refills: 0 | Status: SHIPPED | OUTPATIENT
Start: 2025-06-06 | End: 2025-06-16

## 2025-06-06 RX ORDER — LISINOPRIL 20 MG/1
20 TABLET ORAL DAILY
COMMUNITY

## 2025-06-06 RX ORDER — METOPROLOL TARTRATE 100 MG/1
100 TABLET ORAL 2 TIMES DAILY
COMMUNITY

## 2025-06-06 ASSESSMENT — ENCOUNTER SYMPTOMS
WHEEZING: 0
COUGH: 0
SINUS PRESSURE: 0
ABDOMINAL DISTENTION: 0
SHORTNESS OF BREATH: 0
TROUBLE SWALLOWING: 0
EYE DISCHARGE: 0
COLOR CHANGE: 0
SORE THROAT: 0
EYE PAIN: 0
STRIDOR: 0
CHEST TIGHTNESS: 0
ABDOMINAL PAIN: 0

## 2025-06-06 NOTE — PROGRESS NOTES
[facility], [facility] ([place])  [facility]  [address]  [address]  [place] [address]  Dept: [phone]    Juaquin Weaver is a 64 y.o. male who presents today for his medical conditions/complaints as noted below.  Juaquin Weaver is complaining of Laceration (R pointer finger)        HPI:     Chad Reza presents to the office complaining of a laceration to his right index finger.  Symptoms started last night after slicing finger on vegetable slicer.  Patient TDAP is UTD.  Patient is on aspirin.  Denies tingling/numbness.    Past Medical History:   Diagnosis Date    Heart attack (HCC)        Past Surgical History:   Procedure Laterality Date    CAROTID STENT      CORONARY ANGIOPLASTY WITH STENT PLACEMENT         No family history on file.    Social History     Tobacco Use    Smoking status: Unknown    Smokeless tobacco: Not on file   Substance Use Topics    Alcohol use: Not on file        Current Outpatient Medications   Medication Sig Dispense Refill    sulfamethoxazole-trimethoprim (BACTRIM DS;SEPTRA DS) 800-160 MG per tablet Take 1 tablet by mouth 2 times daily for 10 days 20 tablet 0    metoprolol (LOPRESSOR) 100 MG tablet Take 1 tablet by mouth 2 times daily      lisinopril (PRINIVIL;ZESTRIL) 20 MG tablet Take 1 tablet by mouth daily      aspirin 81 MG chewable tablet Take 1 tablet by mouth daily       No current facility-administered medications for this visit.       No Known Allergies    There are no preventive care reminders to display for this patient.    Subjective:   Review of Systems   Constitutional:  Negative for chills, fatigue and fever.   HENT:  Negative for congestion, sinus pressure, sore throat and trouble swallowing.    Eyes:  Negative for pain and discharge.   Respiratory:  Negative for cough, chest tightness, shortness of breath, wheezing and stridor.    Cardiovascular:  Negative for chest pain and palpitations.   Gastrointestinal:  Negative for

## 2025-06-06 NOTE — PATIENT INSTRUCTIONS
Explained that  I cannot do a repair as he sliced the tip of his finger off.  Wound cleaned and dressing applied  Bactrim sent to prevent secondary infection  Keep wound clean and dry  Follow-up with PCP as needed  Go to ER for worrisome symptoms    Patient verbalized understanding and agrees to plan.

## 2025-07-23 DIAGNOSIS — I25.10 CORONARY ARTERY DISEASE: ICD-10-CM

## 2025-07-23 RX ORDER — CLOPIDOGREL BISULFATE 75 MG/1
75 TABLET ORAL DAILY
Qty: 90 TABLET | Refills: 3 | Status: SHIPPED | OUTPATIENT
Start: 2025-07-23 | End: 2026-07-18

## 2025-09-04 ENCOUNTER — OFFICE VISIT (OUTPATIENT)
Dept: FAMILY MEDICINE | Facility: CLINIC | Age: 65
End: 2025-09-04
Payer: MEDICARE

## 2025-09-04 VITALS
BODY MASS INDEX: 36.33 KG/M2 | WEIGHT: 239.69 LBS | HEART RATE: 76 BPM | SYSTOLIC BLOOD PRESSURE: 147 MMHG | DIASTOLIC BLOOD PRESSURE: 84 MMHG | HEIGHT: 68 IN | OXYGEN SATURATION: 97 %

## 2025-09-04 DIAGNOSIS — I10 PRIMARY HYPERTENSION: Chronic | ICD-10-CM

## 2025-09-04 DIAGNOSIS — H93.13 TINNITUS OF BOTH EARS: Chronic | ICD-10-CM

## 2025-09-04 DIAGNOSIS — H91.93 DECREASED HEARING OF BOTH EARS: Primary | Chronic | ICD-10-CM

## 2025-09-04 PROCEDURE — 99213 OFFICE O/P EST LOW 20 MIN: CPT | Mod: ,,, | Performed by: FAMILY MEDICINE

## (undated) DEVICE — CANISTER SUCTION JUMBO 12L

## (undated) DEVICE — GOWN NONREINF SET-IN SLV 2XL

## (undated) DEVICE — GUIDEWIRE ZIPWIRE STR .038 150

## (undated) DEVICE — CATH AXXCESS URET 6F 70CM

## (undated) DEVICE — GLOVE SENSICARE PI SURG 7.5

## (undated) DEVICE — EXTRACTOR TIPLESS 2.4FRX1115CM

## (undated) DEVICE — SET IRRI FLUID WARMING 300MMHG

## (undated) DEVICE — FIBER MOSES 365 DFL

## (undated) DEVICE — KIT LITHOTOMY RUSH

## (undated) DEVICE — TRAY VAG PREP

## (undated) DEVICE — SOL NACL IRR 3000ML